# Patient Record
Sex: FEMALE | Race: WHITE | NOT HISPANIC OR LATINO | Employment: FULL TIME | URBAN - METROPOLITAN AREA
[De-identification: names, ages, dates, MRNs, and addresses within clinical notes are randomized per-mention and may not be internally consistent; named-entity substitution may affect disease eponyms.]

---

## 2023-10-31 PROBLEM — K44.9 HIATAL HERNIA: Status: ACTIVE | Noted: 2023-10-31

## 2023-10-31 PROBLEM — Z90.3 S/P GASTRIC SLEEVE PROCEDURE: Status: ACTIVE | Noted: 2023-10-31

## 2023-10-31 PROBLEM — K21.9 GERD (GASTROESOPHAGEAL REFLUX DISEASE): Status: ACTIVE | Noted: 2023-10-31

## 2023-10-31 NOTE — PROGRESS NOTES
GENERAL SURGERY CLINIC  FOLLOW UP NOTE      Name: Juana Christianson  MRN: [id number]      Index Surgery  Date of Surgery: 11/15/2023   Surgeon: Dr. Riaz Contreras    Surgical Procedure: Hiatal hernia repair 26774    HPI: 43 year old female with remote history of a gastric sleeve in 2020 at [facility].  Patient presented to our clinic earlier this year with complains of GERD, not controlled with multiple medications.  Patient is now scheduled for a hiatal hernia repair on 11/15/2023, presenting for a final pre op visit.     Upper GI: FINDINGS:  The patient is status post gastric sleeve procedure. A suture line is  identified along the greater curvature of the gastric remnant in the  distal half of the stomach which extends medially in the proximal  half and above the level of the hemidiaphragm. This appearance  suggests small hiatal hernia. There is associated full thickening at  the level of the small hiatal hernia extending into the stomach  Fundus compatible with gastritis.     The remainder of the stomach is not optimally opacified. There is no  intraluminal filling defect or abnormal collection of contrast. The  visualized fold patterns are otherwise unremarkable. There is no  extravasation of contrast from the surgical site.     The duodenal bulb is well distended. The bulb is of normal  morphology. There is no intraluminal filling defect or abnormal  collection of contrast on barium filled and air contrast images. The  fold pattern is unremarkable.     The post bulbar duodenum is of normal caliber and distribution. The  ligament of Treitz is normally positioned. The fold pattern within  normal limits.     The esophagus is of normal uniform caliber. There is no annular mass  or stricture.    REVIEW OF SYSTEMS:  CONSTITUTIONAL: Patient denies fevers, chills, sweats and weight changes.  CARDIOVASCULAR: Patient denies chest pains, palpitations, orthopnea and paroxysmal nocturnal dyspnea.  RESPIRATORY: No dyspnea  "on exertion, no wheezing or cough.  GI: No nausea, vomiting, diarrhea, constipation, abdominal pain, hematochezia or melena.  MUSCULOSKELETAL: No myalgias or arthralgias.  NEUROLOGIC: No chronic headaches, no seizures. Patient denies numbness, tingling or weakness.  PSYCHIATRIC: Patient denies problems with mood disturbance. No problems with anxiety.  ENDOCRINE: No excessive urination or excessive thirst.  DERMATOLOGIC: Patient denies any rashes or skin changes.    PHYSICAL EXAM:  /83 (BP Location: Left arm, Patient Position: Sitting, BP Cuff Size: Adult)   Pulse 81   Ht 1.575 m (5' 2\")   Wt 64.4 kg (142 lb)   SpO2 99%   BMI 25.97 kg/m²   Alert, well appearing, no acute distress, nourished, hydrated.  Anicteric sclera, no ptosis  Facial symmetry  Neck supple  Unlabored respirations.  Easily conversant without increased respiratory effort  Oriented to person, place, time.  Judgement intact.  Appropriate mood, affect.       ASSESSMENT & PLAN:  43 y.o. female presenting for a final pre-op visit for a hiatal hernia repair scheduled for 11/15/2023.   We had a long discussion again.    Patient reflux symptoms are even worse than before she is having significant regurgitation waking up middle of the night with acid reflux and then not being able to sleep with poor quality of life, feeling tired all the time because of lack of sleep and regretting overall decision of doing sleeve gastrectomy altogether.  We had talked about options in the past including magnetic finger augmentation which she was absolutely not in favor of, hiatal hernia repair alone, gastric bypass.  I received the previous endoscopy reports and on endoscopy she has mixed reports of having small hiatal hernia on 1 and not on the other she did have significant esophagitis on one of the endoscopies which seem to be better on the follow-up EGD however now her symptoms are worse so the status is rather unknown.  Her mom has history of Henry's " esophagus and patient is now worried about herself developing Henry's esophagus which with ongoing reflux and extent of the reflux I think is not an unreasonable fear.  Meanwhile upper GI was obtained which showed I reviewed the images and the proximal sleeve seems to be really angulated although I do not see an obstruction or twisting however the shape of the sleeve itself has taken almost an S shaped.  Based on all the above information I offered her gastric bypass as the first choice with the second choice other options.  She still wants to proceed with diagnostic laparoscopy and hiatal hernia repair however if during the surgery I feel stomach sleeve shape is not fixable then proceed with gastric bypass right now instead of doing it in future which inevitably she may need.  We discussed the risk, benefit alternatives of the gastric bypass and all questions were answered.  Need for vitamin supplementation, risks of bleeding infection, ulcer chronic pain, reflux, hernia, protein calorie malnutrition, consequences of weight loss etc. were all discussed.  We will proceed as planned and discussed above.    Plan:

## 2023-10-31 NOTE — PATIENT INSTRUCTIONS
You are scheduled for a hiatal hernia repair with Dr. Contreras on 11/15/2023.     Please review your dietary guidelines in the folder you were given today.  Please call Mayela at [phone] if you have any questions related to your diet.     We have sent in prescriptions to your pharmacy, please pick them up today.  Ondansetron (nausea), Omeprazole (GERD), Oxycodone (Pain).     If you have any questions please call Dr. Contreras' nurseKrista at [phone].

## 2023-11-03 ENCOUNTER — APPOINTMENT (OUTPATIENT)
Dept: SURGERY | Facility: CLINIC | Age: 43
End: 2023-11-03
Payer: COMMERCIAL

## 2023-11-09 ENCOUNTER — OFFICE VISIT (OUTPATIENT)
Dept: SURGERY | Facility: CLINIC | Age: 43
End: 2023-11-09
Payer: COMMERCIAL

## 2023-11-09 ENCOUNTER — NUTRITION (OUTPATIENT)
Dept: SURGERY | Facility: CLINIC | Age: 43
End: 2023-11-09
Payer: COMMERCIAL

## 2023-11-09 VITALS
OXYGEN SATURATION: 99 % | HEIGHT: 62 IN | WEIGHT: 142 LBS | BODY MASS INDEX: 26.13 KG/M2 | SYSTOLIC BLOOD PRESSURE: 125 MMHG | HEART RATE: 81 BPM | DIASTOLIC BLOOD PRESSURE: 83 MMHG

## 2023-11-09 DIAGNOSIS — K44.9 HIATAL HERNIA: Primary | ICD-10-CM

## 2023-11-09 DIAGNOSIS — R11.2 POST-OPERATIVE NAUSEA AND VOMITING: ICD-10-CM

## 2023-11-09 DIAGNOSIS — K21.9 CHRONIC GASTROESOPHAGEAL REFLUX DISEASE: ICD-10-CM

## 2023-11-09 DIAGNOSIS — Z90.3 S/P GASTRIC SLEEVE PROCEDURE: ICD-10-CM

## 2023-11-09 DIAGNOSIS — G89.18 POST-OP PAIN: ICD-10-CM

## 2023-11-09 DIAGNOSIS — K21.9 GASTROESOPHAGEAL REFLUX DISEASE, UNSPECIFIED WHETHER ESOPHAGITIS PRESENT: ICD-10-CM

## 2023-11-09 DIAGNOSIS — Z98.890 POST-OPERATIVE NAUSEA AND VOMITING: ICD-10-CM

## 2023-11-09 PROBLEM — E66.01 MORBID OBESITY (MULTI): Status: ACTIVE | Noted: 2019-09-07

## 2023-11-09 PROBLEM — E66.01 OBESITY, CLASS III, BMI 40-49.9 (MORBID OBESITY) (MULTI): Status: ACTIVE | Noted: 2019-05-10

## 2023-11-09 PROBLEM — F17.200 TOBACCO USE DISORDER: Status: ACTIVE | Noted: 2019-09-07

## 2023-11-09 PROBLEM — G56.00 CARPAL TUNNEL SYNDROME: Status: ACTIVE | Noted: 2019-05-10

## 2023-11-09 PROCEDURE — 99215 OFFICE O/P EST HI 40 MIN: CPT | Performed by: SURGERY

## 2023-11-09 PROCEDURE — 3008F BODY MASS INDEX DOCD: CPT | Performed by: SURGERY

## 2023-11-09 PROCEDURE — 1036F TOBACCO NON-USER: CPT | Performed by: SURGERY

## 2023-11-09 RX ORDER — SUCRALFATE 1 G/10ML
SUSPENSION ORAL
Status: ON HOLD | COMMUNITY
Start: 2023-01-12 | End: 2023-11-15 | Stop reason: ALTCHOICE

## 2023-11-09 RX ORDER — GABAPENTIN 300 MG/1
600 CAPSULE ORAL ONCE
Status: CANCELLED | OUTPATIENT
Start: 2023-11-09 | End: 2023-11-09

## 2023-11-09 RX ORDER — ESCITALOPRAM OXALATE 10 MG/1
10 TABLET ORAL DAILY
COMMUNITY
Start: 2023-05-30 | End: 2023-11-09 | Stop reason: ALTCHOICE

## 2023-11-09 RX ORDER — DEXTROAMPHETAMINE SACCHARATE, AMPHETAMINE ASPARTATE MONOHYDRATE, DEXTROAMPHETAMINE SULFATE AND AMPHETAMINE SULFATE 3.75; 3.75; 3.75; 3.75 MG/1; MG/1; MG/1; MG/1
15 CAPSULE, EXTENDED RELEASE ORAL EVERY MORNING
COMMUNITY
Start: 2023-10-13 | End: 2024-02-16 | Stop reason: ENTERED-IN-ERROR

## 2023-11-09 RX ORDER — SODIUM CHLORIDE, SODIUM LACTATE, POTASSIUM CHLORIDE, CALCIUM CHLORIDE 600; 310; 30; 20 MG/100ML; MG/100ML; MG/100ML; MG/100ML
150 INJECTION, SOLUTION INTRAVENOUS CONTINUOUS
Status: CANCELLED | OUTPATIENT
Start: 2023-11-15

## 2023-11-09 RX ORDER — OXYCODONE HCL 5 MG/5 ML
5 SOLUTION, ORAL ORAL EVERY 6 HOURS PRN
Qty: 140 ML | Refills: 0 | Status: SHIPPED | OUTPATIENT
Start: 2023-11-09 | End: 2023-11-17 | Stop reason: HOSPADM

## 2023-11-09 RX ORDER — OMEPRAZOLE 40 MG/1
40 CAPSULE, DELAYED RELEASE ORAL
Qty: 90 CAPSULE | Refills: 1 | Status: SHIPPED | OUTPATIENT
Start: 2023-11-09 | End: 2024-05-07

## 2023-11-09 RX ORDER — DEXTROAMPHETAMINE SACCHARATE, AMPHETAMINE ASPARTATE MONOHYDRATE, DEXTROAMPHETAMINE SULFATE AND AMPHETAMINE SULFATE 2.5; 2.5; 2.5; 2.5 MG/1; MG/1; MG/1; MG/1
10 CAPSULE, EXTENDED RELEASE ORAL EVERY MORNING
COMMUNITY
Start: 2023-07-05 | End: 2023-11-14 | Stop reason: ALTCHOICE

## 2023-11-09 RX ORDER — SERTRALINE HYDROCHLORIDE 50 MG/1
50 TABLET, FILM COATED ORAL DAILY
COMMUNITY
Start: 2023-04-28 | End: 2023-11-09 | Stop reason: ALTCHOICE

## 2023-11-09 RX ORDER — OMEPRAZOLE 40 MG/1
80 CAPSULE, DELAYED RELEASE ORAL EVERY MORNING
Status: ON HOLD | COMMUNITY
Start: 2022-12-02 | End: 2023-11-15 | Stop reason: ALTCHOICE

## 2023-11-09 RX ORDER — FAMOTIDINE 20 MG/1
20 TABLET, FILM COATED ORAL 2 TIMES DAILY PRN
COMMUNITY
Start: 2023-02-07

## 2023-11-09 RX ORDER — ONDANSETRON 4 MG/1
4 TABLET, ORALLY DISINTEGRATING ORAL EVERY 6 HOURS PRN
Qty: 60 TABLET | Refills: 1 | Status: SHIPPED | OUTPATIENT
Start: 2023-11-09

## 2023-11-09 RX ORDER — SCOLOPAMINE TRANSDERMAL SYSTEM 1 MG/1
1 PATCH, EXTENDED RELEASE TRANSDERMAL ONCE
Status: CANCELLED | OUTPATIENT
Start: 2023-11-09 | End: 2023-11-09

## 2023-11-09 RX ORDER — CEFAZOLIN SODIUM 2 G/100ML
2 INJECTION, SOLUTION INTRAVENOUS ONCE
Status: CANCELLED | OUTPATIENT
Start: 2023-11-15 | End: 2023-11-09

## 2023-11-09 NOTE — PROGRESS NOTES
Saw pt today  to review postop diet for HHR.   Since pt is s/p sleeve gastrectomy, gave her a copy of the NG for Gastric bypass and sleeve.   Reviewed fluid and protein goals.   Discussed postop supplements and need to take chewables for after surgery.   Reviewed the full liquid diet for after surgery.   Pt will call if she has questions.   Mayela

## 2023-11-14 PROBLEM — K21.9 CHRONIC GASTROESOPHAGEAL REFLUX DISEASE: Status: ACTIVE | Noted: 2023-11-14

## 2023-11-14 ASSESSMENT — ENCOUNTER SYMPTOMS
TREMORS: 0
APNEA: 0
COLOR CHANGE: 0
FLANK PAIN: 0
FACIAL SWELLING: 0
LIGHT-HEADEDNESS: 0
VOMITING: 0
FEVER: 0
CHOKING: 0
HALLUCINATIONS: 0
CONFUSION: 0
POLYDIPSIA: 0
SINUS PAIN: 0
AGITATION: 0
NAUSEA: 0
CHEST TIGHTNESS: 0
PALPITATIONS: 0
RHINORRHEA: 0
DIFFICULTY URINATING: 0
NUMBNESS: 0
CHILLS: 0
POLYPHAGIA: 0

## 2023-11-14 NOTE — H&P
History Of Present Illness  Juana Christianson is a 43 y.o. female presenting  presenting for evaluation of reflux symptoms are even worse than before she is having significant regurgitation waking up middle of the night with acid reflux and then not being able to sleep with poor quality of life, feeling tired all the time because of lack of sleep and regretting overall decision of doing sleeve gastrectomy altogether.  We had talked about options in the past including magnetic finger augmentation which she was absolutely not in favor of, hiatal hernia repair alone, gastric bypass.  I received the previous endoscopy reports and on endoscopy she has mixed reports of having small hiatal hernia on 1 and not on the other she did have significant esophagitis on one of the endoscopies which seem to be better on the follow-up EGD however now her symptoms are worse so the status is rather unknown.  Her mom has history of Henry's esophagus and patient is now worried about herself developing Henry's esophagus which with ongoing reflux and extent of the reflux I think is not an unreasonable fear.  Meanwhile upper GI was obtained which showed I reviewed the images and the proximal sleeve seems to be really angulated although I do not see an obstruction or twisting however the shape of the sleeve itself has taken almost an S shaped.  Based on all the above information I offered her gastric bypass as the first choice with the second choice other options.  She still wants to proceed with diagnostic laparoscopy and hiatal hernia repair however if during the surgery I feel stomach sleeve shape is not fixable then proceed with gastric bypass right now instead of doing it in future which inevitably she may need.  We discussed the risk, benefit alternatives of the gastric bypass and all questions were answered.  Need for vitamin supplementation, risks of bleeding infection, ulcer chronic pain, reflux, hernia, protein calorie  malnutrition, consequences of weight loss etc. were all discussed.     Past Medical History  Past Medical History:   Diagnosis Date    H/O gastric sleeve     Hiatal hernia        Surgical History  Past Surgical History:   Procedure Laterality Date    CHOLECYSTECTOMY  2009    GASTRIC BYPASS  3/13/20    HYSTERECTOMY  2010    OTHER SURGICAL HISTORY      Sleeve Gastrectomy at [facility] in 2020        Social History  She reports that she quit smoking about 3 years ago. Her smoking use included cigarettes. She started smoking about 7 years ago. She has a 2.50 pack-year smoking history. She has never used smokeless tobacco. She reports that she does not currently use alcohol. She reports that she does not use drugs.    Family History  Family History   Problem Relation Name Age of Onset    Stroke Mother Araceli Christianson     Heart disease Father Quentin Wong         Allergies  Patient has no known allergies.    Review of Systems   Constitutional:  Negative for chills and fever.   HENT:  Negative for facial swelling, nosebleeds, rhinorrhea and sinus pain.    Respiratory:  Negative for apnea, choking and chest tightness.    Cardiovascular:  Negative for chest pain, palpitations and leg swelling.   Gastrointestinal:  Negative for nausea and vomiting.   Endocrine: Negative for polydipsia, polyphagia and polyuria.   Genitourinary:  Negative for difficulty urinating, flank pain and urgency.   Skin:  Negative for color change, pallor and rash.   Neurological:  Negative for tremors, light-headedness and numbness.   Psychiatric/Behavioral:  Negative for agitation, behavioral problems, confusion, hallucinations and self-injury.         Physical Exam   Physical Exam:    Constitutional: no distress, alert and cooperative    Eyes: EOMI, clear sclera    Head/Neck: Neck supple, no apparent injury, No JVD, trachea midline    Respiratory/Thorax: good chest expansion, thorax symmetric    Cardiovascular: Regular heart rate    Abdominal:  Incisions intact and clean, nondistended, soft, normal     Musculoskeletal: ROM intact, no joint swelling    Extremities: Grossly normal extremities    Neurological: alert and oriented x3, intact senses    Psychological: Appropriate mood and behavior    Skin: Warm and dry, no lesions, no rashes   Last Recorded Vitals  There were no vitals taken for this visit.    Relevant Results        c     Assessment/Plan   Active Problems:  There are no active Hospital Problems.      Pt is a 44 y/o female with a history of sleeve gastrectomy here for significant reflux     Plan   Will perform a hiatal hernia repair with acid diverging procedure     I spent 25 minutes in the professional and overall care of this patient.      Delvin Lorenz MD

## 2023-11-14 NOTE — PREPROCEDURE INSTRUCTIONS
Current Medications   Medication Instructions    amphetamine-dextroamphetamine XR (Adderall XR) 15 mg 24 hr capsule Continue until night before surgery    famotidine (Pepcid) 20 mg tablet Take morning of surgery with sip of water, no other fluids    multivitamin/iron/folic acid (CENTRUM WOMEN ORAL) Continue until night before surgery    omeprazole (PriLOSEC) 40 mg DR capsule Take morning of surgery with sip of water, no other fluids    sucralfate (Carafate) 100 mg/mL suspension Continue until night before surgery       Arrive at 0630 AM for surgery at 0830 AM   Enter through main entrance of [facility] building, located at [address] [place] [address]. Proceed to registration, located in the back right hand corner. You will need your ID and insurance card for registration. Please ensure you have a responsible adult to drive you home.     Shower the morning of or night before your procedure. After you shower avoid lotions, powders, deodorants or anything applied to the skin. If you wear contacts or glasses, wear the glasses. If you do not have glasses, please bring a case for your contacts. You may wear hearing aids and dentures, bring a case for them or we will provide one. Make sure you wear something loose and comfortable. Keep in mind your surgery type and wear something that will accommodate incisions or bandages. Please remove all jewelry.     Nothing to eat or drink after midnight (including coffee, tea, gum etc). You may take medications discussed during phone call with a small sip of water.    For further questions [place] JANUSZ can be contacted at [phone] between 7AM-3PM.

## 2023-11-15 ENCOUNTER — ANESTHESIA (OUTPATIENT)
Dept: OPERATING ROOM | Facility: HOSPITAL | Age: 43
DRG: 328 | End: 2023-11-15
Payer: COMMERCIAL

## 2023-11-15 ENCOUNTER — ANESTHESIA EVENT (OUTPATIENT)
Dept: OPERATING ROOM | Facility: HOSPITAL | Age: 43
DRG: 328 | End: 2023-11-15
Payer: COMMERCIAL

## 2023-11-15 ENCOUNTER — HOSPITAL ENCOUNTER (INPATIENT)
Facility: HOSPITAL | Age: 43
LOS: 2 days | Discharge: HOME | DRG: 328 | End: 2023-11-17
Attending: SURGERY | Admitting: SURGERY
Payer: COMMERCIAL

## 2023-11-15 DIAGNOSIS — K44.9 HIATAL HERNIA: ICD-10-CM

## 2023-11-15 DIAGNOSIS — K44.9 DIAPHRAGMATIC HERNIA WITHOUT OBSTRUCTION OR GANGRENE: ICD-10-CM

## 2023-11-15 DIAGNOSIS — K21.9 CHRONIC GASTROESOPHAGEAL REFLUX DISEASE: Primary | ICD-10-CM

## 2023-11-15 DIAGNOSIS — K21.9 GASTROESOPHAGEAL REFLUX DISEASE, UNSPECIFIED WHETHER ESOPHAGITIS PRESENT: ICD-10-CM

## 2023-11-15 DIAGNOSIS — G89.18 POST-OP PAIN: ICD-10-CM

## 2023-11-15 LAB
ERYTHROCYTE [DISTWIDTH] IN BLOOD BY AUTOMATED COUNT: 13.1 % (ref 11.5–14.5)
HCT VFR BLD AUTO: 41.7 % (ref 36–46)
HGB BLD-MCNC: 14.3 G/DL (ref 12–16)
MCH RBC QN AUTO: 31.6 PG (ref 26–34)
MCHC RBC AUTO-ENTMCNC: 34.3 G/DL (ref 32–36)
MCV RBC AUTO: 92 FL (ref 80–100)
NRBC BLD-RTO: 0 /100 WBCS (ref 0–0)
PLATELET # BLD AUTO: 248 X10*3/UL (ref 150–450)
RBC # BLD AUTO: 4.52 X10*6/UL (ref 4–5.2)
WBC # BLD AUTO: 5.2 X10*3/UL (ref 4.4–11.3)

## 2023-11-15 PROCEDURE — 3600000009 HC OR TIME - EACH INCREMENTAL 1 MINUTE - PROCEDURE LEVEL FOUR: Performed by: SURGERY

## 2023-11-15 PROCEDURE — 43644 LAP GASTRIC BYPASS/ROUX-EN-Y: CPT | Performed by: SURGERY

## 2023-11-15 PROCEDURE — A43644 PR LAP GASTRIC BYPASS/ROUX-EN-Y: Performed by: ANESTHESIOLOGIST ASSISTANT

## 2023-11-15 PROCEDURE — 36415 COLL VENOUS BLD VENIPUNCTURE: CPT | Performed by: STUDENT IN AN ORGANIZED HEALTH CARE EDUCATION/TRAINING PROGRAM

## 2023-11-15 PROCEDURE — 0D164ZA BYPASS STOMACH TO JEJUNUM, PERCUTANEOUS ENDOSCOPIC APPROACH: ICD-10-PCS | Performed by: SURGERY

## 2023-11-15 PROCEDURE — 7100000001 HC RECOVERY ROOM TIME - INITIAL BASE CHARGE: Performed by: SURGERY

## 2023-11-15 PROCEDURE — 2500000004 HC RX 250 GENERAL PHARMACY W/ HCPCS (ALT 636 FOR OP/ED): Performed by: STUDENT IN AN ORGANIZED HEALTH CARE EDUCATION/TRAINING PROGRAM

## 2023-11-15 PROCEDURE — 3700000002 HC GENERAL ANESTHESIA TIME - EACH INCREMENTAL 1 MINUTE: Performed by: SURGERY

## 2023-11-15 PROCEDURE — 43281 LAP PARAESOPHAG HERN REPAIR: CPT | Performed by: STUDENT IN AN ORGANIZED HEALTH CARE EDUCATION/TRAINING PROGRAM

## 2023-11-15 PROCEDURE — 96372 THER/PROPH/DIAG INJ SC/IM: CPT | Performed by: STUDENT IN AN ORGANIZED HEALTH CARE EDUCATION/TRAINING PROGRAM

## 2023-11-15 PROCEDURE — 7100000002 HC RECOVERY ROOM TIME - EACH INCREMENTAL 1 MINUTE: Performed by: SURGERY

## 2023-11-15 PROCEDURE — 85027 COMPLETE CBC AUTOMATED: CPT | Performed by: STUDENT IN AN ORGANIZED HEALTH CARE EDUCATION/TRAINING PROGRAM

## 2023-11-15 PROCEDURE — 3700000001 HC GENERAL ANESTHESIA TIME - INITIAL BASE CHARGE: Performed by: SURGERY

## 2023-11-15 PROCEDURE — C1781 MESH (IMPLANTABLE): HCPCS | Performed by: SURGERY

## 2023-11-15 PROCEDURE — A43644 PR LAP GASTRIC BYPASS/ROUX-EN-Y: Performed by: ANESTHESIOLOGY

## 2023-11-15 PROCEDURE — 2500000004 HC RX 250 GENERAL PHARMACY W/ HCPCS (ALT 636 FOR OP/ED): Performed by: ANESTHESIOLOGIST ASSISTANT

## 2023-11-15 PROCEDURE — 2500000005 HC RX 250 GENERAL PHARMACY W/O HCPCS: Performed by: ANESTHESIOLOGIST ASSISTANT

## 2023-11-15 PROCEDURE — 2720000007 HC OR 272 NO HCPCS: Performed by: SURGERY

## 2023-11-15 PROCEDURE — 0DJ08ZZ INSPECTION OF UPPER INTESTINAL TRACT, VIA NATURAL OR ARTIFICIAL OPENING ENDOSCOPIC: ICD-10-PCS | Performed by: SURGERY

## 2023-11-15 PROCEDURE — 2500000005 HC RX 250 GENERAL PHARMACY W/O HCPCS: Performed by: STUDENT IN AN ORGANIZED HEALTH CARE EDUCATION/TRAINING PROGRAM

## 2023-11-15 PROCEDURE — 43281 LAP PARAESOPHAG HERN REPAIR: CPT | Performed by: SURGERY

## 2023-11-15 PROCEDURE — 1100000001 HC PRIVATE ROOM DAILY

## 2023-11-15 PROCEDURE — 0BQT4ZZ REPAIR DIAPHRAGM, PERCUTANEOUS ENDOSCOPIC APPROACH: ICD-10-PCS | Performed by: SURGERY

## 2023-11-15 PROCEDURE — 2500000004 HC RX 250 GENERAL PHARMACY W/ HCPCS (ALT 636 FOR OP/ED): Performed by: SURGERY

## 2023-11-15 PROCEDURE — 2500000004 HC RX 250 GENERAL PHARMACY W/ HCPCS (ALT 636 FOR OP/ED): Performed by: ANESTHESIOLOGY

## 2023-11-15 PROCEDURE — 3600000004 HC OR TIME - INITIAL BASE CHARGE - PROCEDURE LEVEL FOUR: Performed by: SURGERY

## 2023-11-15 PROCEDURE — 2500000001 HC RX 250 WO HCPCS SELF ADMINISTERED DRUGS (ALT 637 FOR MEDICARE OP): Performed by: STUDENT IN AN ORGANIZED HEALTH CARE EDUCATION/TRAINING PROGRAM

## 2023-11-15 PROCEDURE — 2780000003 HC OR 278 NO HCPCS: Performed by: SURGERY

## 2023-11-15 RX ORDER — SUCCINYLCHOLINE CHLORIDE 20 MG/ML INJECTION SOLUTION
SOLUTION AS NEEDED
Status: DISCONTINUED | OUTPATIENT
Start: 2023-11-15 | End: 2023-11-15

## 2023-11-15 RX ORDER — LABETALOL HYDROCHLORIDE 5 MG/ML
5 INJECTION, SOLUTION INTRAVENOUS ONCE AS NEEDED
Status: DISCONTINUED | OUTPATIENT
Start: 2023-11-15 | End: 2023-11-15 | Stop reason: HOSPADM

## 2023-11-15 RX ORDER — ONDANSETRON HYDROCHLORIDE 2 MG/ML
INJECTION, SOLUTION INTRAVENOUS AS NEEDED
Status: DISCONTINUED | OUTPATIENT
Start: 2023-11-15 | End: 2023-11-15

## 2023-11-15 RX ORDER — MEPERIDINE HYDROCHLORIDE 25 MG/ML
INJECTION INTRAMUSCULAR; INTRAVENOUS; SUBCUTANEOUS AS NEEDED
Status: DISCONTINUED | OUTPATIENT
Start: 2023-11-15 | End: 2023-11-15

## 2023-11-15 RX ORDER — ACETAMINOPHEN 650 MG/1
650 SUPPOSITORY RECTAL EVERY 6 HOURS
Status: DISCONTINUED | OUTPATIENT
Start: 2023-11-15 | End: 2023-11-17 | Stop reason: HOSPADM

## 2023-11-15 RX ORDER — ONDANSETRON HYDROCHLORIDE 2 MG/ML
4 INJECTION, SOLUTION INTRAVENOUS ONCE AS NEEDED
Status: DISCONTINUED | OUTPATIENT
Start: 2023-11-15 | End: 2023-11-15 | Stop reason: HOSPADM

## 2023-11-15 RX ORDER — OXYCODONE HCL 5 MG/5 ML
5 SOLUTION, ORAL ORAL EVERY 4 HOURS PRN
Status: DISCONTINUED | OUTPATIENT
Start: 2023-11-15 | End: 2023-11-17 | Stop reason: HOSPADM

## 2023-11-15 RX ORDER — NORETHINDRONE AND ETHINYL ESTRADIOL 0.5-0.035
KIT ORAL AS NEEDED
Status: DISCONTINUED | OUTPATIENT
Start: 2023-11-15 | End: 2023-11-15

## 2023-11-15 RX ORDER — NALOXONE HYDROCHLORIDE 1 MG/ML
0.2 INJECTION INTRAMUSCULAR; INTRAVENOUS; SUBCUTANEOUS EVERY 5 MIN PRN
Status: DISCONTINUED | OUTPATIENT
Start: 2023-11-15 | End: 2023-11-17 | Stop reason: HOSPADM

## 2023-11-15 RX ORDER — CEFAZOLIN SODIUM 2 G/100ML
2 INJECTION, SOLUTION INTRAVENOUS EVERY 8 HOURS
Status: COMPLETED | OUTPATIENT
Start: 2023-11-15 | End: 2023-11-16

## 2023-11-15 RX ORDER — GABAPENTIN 250 MG/5ML
100 SOLUTION ORAL 2 TIMES DAILY
Status: DISCONTINUED | OUTPATIENT
Start: 2023-11-15 | End: 2023-11-17 | Stop reason: HOSPADM

## 2023-11-15 RX ORDER — METOCLOPRAMIDE HYDROCHLORIDE 5 MG/ML
10 INJECTION INTRAMUSCULAR; INTRAVENOUS EVERY 6 HOURS PRN
Status: DISCONTINUED | OUTPATIENT
Start: 2023-11-15 | End: 2023-11-17 | Stop reason: HOSPADM

## 2023-11-15 RX ORDER — BUPIVACAINE HYDROCHLORIDE 2.5 MG/ML
INJECTION, SOLUTION EPIDURAL; INFILTRATION; INTRACAUDAL AS NEEDED
Status: DISCONTINUED | OUTPATIENT
Start: 2023-11-15 | End: 2023-11-15 | Stop reason: HOSPADM

## 2023-11-15 RX ORDER — ACETAMINOPHEN 325 MG/1
650 TABLET ORAL EVERY 6 HOURS
Status: DISCONTINUED | OUTPATIENT
Start: 2023-11-15 | End: 2023-11-16

## 2023-11-15 RX ORDER — HYDRALAZINE HYDROCHLORIDE 20 MG/ML
5 INJECTION INTRAMUSCULAR; INTRAVENOUS EVERY 30 MIN PRN
Status: DISCONTINUED | OUTPATIENT
Start: 2023-11-15 | End: 2023-11-15 | Stop reason: HOSPADM

## 2023-11-15 RX ORDER — PANTOPRAZOLE SODIUM 40 MG/1
40 TABLET, DELAYED RELEASE ORAL
Status: DISCONTINUED | OUTPATIENT
Start: 2023-11-16 | End: 2023-11-17 | Stop reason: HOSPADM

## 2023-11-15 RX ORDER — ONDANSETRON HYDROCHLORIDE 2 MG/ML
4 INJECTION, SOLUTION INTRAVENOUS EVERY 8 HOURS PRN
Status: DISCONTINUED | OUTPATIENT
Start: 2023-11-15 | End: 2023-11-17 | Stop reason: HOSPADM

## 2023-11-15 RX ORDER — ACETAMINOPHEN 160 MG/5ML
650 SOLUTION ORAL EVERY 6 HOURS
Status: DISCONTINUED | OUTPATIENT
Start: 2023-11-15 | End: 2023-11-17 | Stop reason: HOSPADM

## 2023-11-15 RX ORDER — METOCLOPRAMIDE 10 MG/1
10 TABLET ORAL EVERY 6 HOURS PRN
Status: DISCONTINUED | OUTPATIENT
Start: 2023-11-15 | End: 2023-11-17 | Stop reason: HOSPADM

## 2023-11-15 RX ORDER — HYDRALAZINE HYDROCHLORIDE 20 MG/ML
5 INJECTION INTRAMUSCULAR; INTRAVENOUS EVERY 4 HOURS PRN
Status: DISCONTINUED | OUTPATIENT
Start: 2023-11-15 | End: 2023-11-17 | Stop reason: HOSPADM

## 2023-11-15 RX ORDER — ESOMEPRAZOLE MAGNESIUM 40 MG/1
40 GRANULE, DELAYED RELEASE ORAL
Status: DISCONTINUED | OUTPATIENT
Start: 2023-11-16 | End: 2023-11-17 | Stop reason: HOSPADM

## 2023-11-15 RX ORDER — MIDAZOLAM HYDROCHLORIDE 1 MG/ML
1 INJECTION, SOLUTION INTRAMUSCULAR; INTRAVENOUS ONCE AS NEEDED
Status: DISCONTINUED | OUTPATIENT
Start: 2023-11-15 | End: 2023-11-15 | Stop reason: HOSPADM

## 2023-11-15 RX ORDER — ACETAMINOPHEN 325 MG/1
650 TABLET ORAL EVERY 6 HOURS
Status: DISCONTINUED | OUTPATIENT
Start: 2023-11-15 | End: 2023-11-17 | Stop reason: HOSPADM

## 2023-11-15 RX ORDER — DEXAMETHASONE SODIUM PHOSPHATE 4 MG/ML
INJECTION, SOLUTION INTRA-ARTICULAR; INTRALESIONAL; INTRAMUSCULAR; INTRAVENOUS; SOFT TISSUE AS NEEDED
Status: DISCONTINUED | OUTPATIENT
Start: 2023-11-15 | End: 2023-11-15

## 2023-11-15 RX ORDER — SODIUM CHLORIDE, SODIUM LACTATE, POTASSIUM CHLORIDE, CALCIUM CHLORIDE 600; 310; 30; 20 MG/100ML; MG/100ML; MG/100ML; MG/100ML
150 INJECTION, SOLUTION INTRAVENOUS CONTINUOUS
Status: DISCONTINUED | OUTPATIENT
Start: 2023-11-15 | End: 2023-11-17 | Stop reason: HOSPADM

## 2023-11-15 RX ORDER — GABAPENTIN 300 MG/1
600 CAPSULE ORAL ONCE
Status: COMPLETED | OUTPATIENT
Start: 2023-11-15 | End: 2023-11-15

## 2023-11-15 RX ORDER — GABAPENTIN 100 MG/1
100 CAPSULE ORAL 2 TIMES DAILY
Status: DISCONTINUED | OUTPATIENT
Start: 2023-11-15 | End: 2023-11-15

## 2023-11-15 RX ORDER — OXYCODONE HCL 5 MG/5 ML
10 SOLUTION, ORAL ORAL EVERY 4 HOURS PRN
Status: DISCONTINUED | OUTPATIENT
Start: 2023-11-15 | End: 2023-11-17 | Stop reason: HOSPADM

## 2023-11-15 RX ORDER — HEPARIN SODIUM 5000 [USP'U]/ML
5000 INJECTION, SOLUTION INTRAVENOUS; SUBCUTANEOUS ONCE
Status: COMPLETED | OUTPATIENT
Start: 2023-11-15 | End: 2023-11-15

## 2023-11-15 RX ORDER — PROPOFOL 10 MG/ML
INJECTION, EMULSION INTRAVENOUS AS NEEDED
Status: DISCONTINUED | OUTPATIENT
Start: 2023-11-15 | End: 2023-11-15

## 2023-11-15 RX ORDER — MULTIVIT-MIN/FERROUS GLUCONATE 9 MG/15 ML
15 LIQUID (ML) ORAL DAILY
Status: DISCONTINUED | OUTPATIENT
Start: 2023-11-15 | End: 2023-11-17 | Stop reason: HOSPADM

## 2023-11-15 RX ORDER — SCOLOPAMINE TRANSDERMAL SYSTEM 1 MG/1
1 PATCH, EXTENDED RELEASE TRANSDERMAL ONCE
Status: DISCONTINUED | OUTPATIENT
Start: 2023-11-15 | End: 2023-11-17 | Stop reason: HOSPADM

## 2023-11-15 RX ORDER — MEPERIDINE HYDROCHLORIDE 25 MG/ML
12.5 INJECTION INTRAMUSCULAR; INTRAVENOUS; SUBCUTANEOUS EVERY 10 MIN PRN
Status: DISCONTINUED | OUTPATIENT
Start: 2023-11-15 | End: 2023-11-15 | Stop reason: HOSPADM

## 2023-11-15 RX ORDER — MIDAZOLAM HYDROCHLORIDE 1 MG/ML
INJECTION, SOLUTION INTRAMUSCULAR; INTRAVENOUS AS NEEDED
Status: DISCONTINUED | OUTPATIENT
Start: 2023-11-15 | End: 2023-11-15

## 2023-11-15 RX ORDER — PANTOPRAZOLE SODIUM 40 MG/10ML
40 INJECTION, POWDER, LYOPHILIZED, FOR SOLUTION INTRAVENOUS
Status: DISCONTINUED | OUTPATIENT
Start: 2023-11-16 | End: 2023-11-17 | Stop reason: HOSPADM

## 2023-11-15 RX ORDER — CEFAZOLIN SODIUM 2 G/100ML
2 INJECTION, SOLUTION INTRAVENOUS ONCE
Status: COMPLETED | OUTPATIENT
Start: 2023-11-15 | End: 2023-11-15

## 2023-11-15 RX ORDER — ROCURONIUM BROMIDE 10 MG/ML
INJECTION, SOLUTION INTRAVENOUS AS NEEDED
Status: DISCONTINUED | OUTPATIENT
Start: 2023-11-15 | End: 2023-11-15

## 2023-11-15 RX ORDER — SIMETHICONE 80 MG
80 TABLET,CHEWABLE ORAL EVERY 4 HOURS PRN
Status: DISCONTINUED | OUTPATIENT
Start: 2023-11-15 | End: 2023-11-17 | Stop reason: HOSPADM

## 2023-11-15 RX ORDER — KETOROLAC TROMETHAMINE 30 MG/ML
15 INJECTION, SOLUTION INTRAMUSCULAR; INTRAVENOUS EVERY 6 HOURS
Status: DISPENSED | OUTPATIENT
Start: 2023-11-15 | End: 2023-11-16

## 2023-11-15 RX ORDER — LIDOCAINE 560 MG/1
1 PATCH PERCUTANEOUS; TOPICAL; TRANSDERMAL EVERY 24 HOURS
Status: DISCONTINUED | OUTPATIENT
Start: 2023-11-15 | End: 2023-11-17 | Stop reason: HOSPADM

## 2023-11-15 RX ORDER — MAGNESIUM HYDROXIDE 2400 MG/10ML
10 SUSPENSION ORAL DAILY PRN
Status: DISCONTINUED | OUTPATIENT
Start: 2023-11-15 | End: 2023-11-17 | Stop reason: HOSPADM

## 2023-11-15 RX ORDER — ALBUTEROL SULFATE 0.83 MG/ML
2.5 SOLUTION RESPIRATORY (INHALATION) ONCE AS NEEDED
Status: DISCONTINUED | OUTPATIENT
Start: 2023-11-15 | End: 2023-11-15 | Stop reason: HOSPADM

## 2023-11-15 RX ORDER — LIDOCAINE HCL/PF 100 MG/5ML
SYRINGE (ML) INTRAVENOUS AS NEEDED
Status: DISCONTINUED | OUTPATIENT
Start: 2023-11-15 | End: 2023-11-15

## 2023-11-15 RX ORDER — PROMETHAZINE HYDROCHLORIDE 25 MG/ML
INJECTION, SOLUTION INTRAMUSCULAR; INTRAVENOUS AS NEEDED
Status: DISCONTINUED | OUTPATIENT
Start: 2023-11-15 | End: 2023-11-15

## 2023-11-15 RX ORDER — CLONIDINE 100 UG/ML
INJECTION, SOLUTION EPIDURAL AS NEEDED
Status: DISCONTINUED | OUTPATIENT
Start: 2023-11-15 | End: 2023-11-15 | Stop reason: HOSPADM

## 2023-11-15 RX ORDER — SODIUM CHLORIDE, SODIUM LACTATE, POTASSIUM CHLORIDE, CALCIUM CHLORIDE 600; 310; 30; 20 MG/100ML; MG/100ML; MG/100ML; MG/100ML
100 INJECTION, SOLUTION INTRAVENOUS CONTINUOUS
Status: DISCONTINUED | OUTPATIENT
Start: 2023-11-15 | End: 2023-11-15 | Stop reason: HOSPADM

## 2023-11-15 RX ORDER — SODIUM CHLORIDE, SODIUM LACTATE, POTASSIUM CHLORIDE, CALCIUM CHLORIDE 600; 310; 30; 20 MG/100ML; MG/100ML; MG/100ML; MG/100ML
100 INJECTION, SOLUTION INTRAVENOUS CONTINUOUS
Status: DISCONTINUED | OUTPATIENT
Start: 2023-11-15 | End: 2023-11-17 | Stop reason: HOSPADM

## 2023-11-15 RX ORDER — DEXMEDETOMIDINE HYDROCHLORIDE 100 UG/ML
INJECTION, SOLUTION INTRAVENOUS AS NEEDED
Status: DISCONTINUED | OUTPATIENT
Start: 2023-11-15 | End: 2023-11-15

## 2023-11-15 RX ORDER — DOCUSATE SODIUM 50 MG/5ML
100 LIQUID ORAL 2 TIMES DAILY
Status: DISCONTINUED | OUTPATIENT
Start: 2023-11-15 | End: 2023-11-17 | Stop reason: HOSPADM

## 2023-11-15 RX ORDER — ACETAMINOPHEN 325 MG/1
650 TABLET ORAL EVERY 4 HOURS PRN
Status: DISCONTINUED | OUTPATIENT
Start: 2023-11-15 | End: 2023-11-15 | Stop reason: HOSPADM

## 2023-11-15 RX ORDER — LIDOCAINE HYDROCHLORIDE 10 MG/ML
0.1 INJECTION, SOLUTION EPIDURAL; INFILTRATION; INTRACAUDAL; PERINEURAL ONCE
Status: DISCONTINUED | OUTPATIENT
Start: 2023-11-15 | End: 2023-11-15 | Stop reason: HOSPADM

## 2023-11-15 RX ORDER — FENTANYL CITRATE 50 UG/ML
INJECTION, SOLUTION INTRAMUSCULAR; INTRAVENOUS AS NEEDED
Status: DISCONTINUED | OUTPATIENT
Start: 2023-11-15 | End: 2023-11-15

## 2023-11-15 RX ORDER — ONDANSETRON 4 MG/1
4 TABLET, ORALLY DISINTEGRATING ORAL EVERY 8 HOURS PRN
Status: DISCONTINUED | OUTPATIENT
Start: 2023-11-15 | End: 2023-11-17 | Stop reason: HOSPADM

## 2023-11-15 RX ORDER — ACETAMINOPHEN 160 MG/5ML
650 SOLUTION ORAL EVERY 4 HOURS PRN
Status: DISCONTINUED | OUTPATIENT
Start: 2023-11-15 | End: 2023-11-17 | Stop reason: HOSPADM

## 2023-11-15 RX ADMIN — FENTANYL CITRATE 50 MCG: 50 INJECTION, SOLUTION INTRAMUSCULAR; INTRAVENOUS at 08:19

## 2023-11-15 RX ADMIN — DEXMEDETOMIDINE HCL 4 MCG: 100 INJECTION INTRAVENOUS at 09:32

## 2023-11-15 RX ADMIN — SUGAMMADEX 250 MG: 100 INJECTION, SOLUTION INTRAVENOUS at 09:20

## 2023-11-15 RX ADMIN — DEXMEDETOMIDINE HCL 4 MCG: 100 INJECTION INTRAVENOUS at 09:11

## 2023-11-15 RX ADMIN — DOCUSATE SODIUM LIQUID 100 MG: 100 LIQUID ORAL at 21:45

## 2023-11-15 RX ADMIN — EPHEDRINE SULFATE 10 MG: 50 INJECTION, SOLUTION INTRAVENOUS at 08:37

## 2023-11-15 RX ADMIN — ROCURONIUM BROMIDE 10 MG: 10 INJECTION, SOLUTION INTRAVENOUS at 09:00

## 2023-11-15 RX ADMIN — Medication 160 MG: at 08:05

## 2023-11-15 RX ADMIN — HYDROMORPHONE HYDROCHLORIDE 0.5 MG: 1 INJECTION, SOLUTION INTRAMUSCULAR; INTRAVENOUS; SUBCUTANEOUS at 10:56

## 2023-11-15 RX ADMIN — HEPARIN SODIUM 5000 UNITS: 5000 INJECTION INTRAVENOUS; SUBCUTANEOUS at 07:28

## 2023-11-15 RX ADMIN — CEFAZOLIN SODIUM 2 G: 2 INJECTION, SOLUTION INTRAVENOUS at 08:13

## 2023-11-15 RX ADMIN — MIDAZOLAM 2 MG: 1 INJECTION INTRAMUSCULAR; INTRAVENOUS at 07:58

## 2023-11-15 RX ADMIN — PROMETHAZINE HYDROCHLORIDE 6.25 MG: 25 INJECTION INTRAMUSCULAR; INTRAVENOUS at 09:34

## 2023-11-15 RX ADMIN — DEXMEDETOMIDINE HCL 4 MCG: 100 INJECTION INTRAVENOUS at 08:46

## 2023-11-15 RX ADMIN — DEXAMETHASONE SODIUM PHOSPHATE 4 MG: 4 INJECTION, SOLUTION INTRAMUSCULAR; INTRAVENOUS at 08:25

## 2023-11-15 RX ADMIN — GABAPENTIN 100 MG: 250 SOLUTION ORAL at 21:44

## 2023-11-15 RX ADMIN — FENTANYL CITRATE 50 MCG: 50 INJECTION, SOLUTION INTRAMUSCULAR; INTRAVENOUS at 08:24

## 2023-11-15 RX ADMIN — FENTANYL CITRATE 50 MCG: 50 INJECTION, SOLUTION INTRAMUSCULAR; INTRAVENOUS at 09:11

## 2023-11-15 RX ADMIN — CEFAZOLIN SODIUM 2 G: 2 INJECTION, SOLUTION INTRAVENOUS at 16:16

## 2023-11-15 RX ADMIN — ACETAMINOPHEN 650 MG: 160 SOLUTION ORAL at 21:42

## 2023-11-15 RX ADMIN — LIDOCAINE 1 PATCH: 4 PATCH TOPICAL at 21:46

## 2023-11-15 RX ADMIN — KETOROLAC TROMETHAMINE 15 MG: 30 INJECTION, SOLUTION INTRAMUSCULAR; INTRAVENOUS at 21:45

## 2023-11-15 RX ADMIN — HYDROMORPHONE HYDROCHLORIDE 0.2 MG: 1 INJECTION, SOLUTION INTRAMUSCULAR; INTRAVENOUS; SUBCUTANEOUS at 10:53

## 2023-11-15 RX ADMIN — GABAPENTIN 600 MG: 300 CAPSULE ORAL at 07:28

## 2023-11-15 RX ADMIN — EPHEDRINE SULFATE 10 MG: 50 INJECTION, SOLUTION INTRAVENOUS at 08:31

## 2023-11-15 RX ADMIN — ONDANSETRON 4 MG: 2 INJECTION INTRAMUSCULAR; INTRAVENOUS at 09:20

## 2023-11-15 RX ADMIN — SODIUM CHLORIDE, SODIUM LACTATE, POTASSIUM CHLORIDE, AND CALCIUM CHLORIDE 150 ML/HR: 600; 310; 30; 20 INJECTION, SOLUTION INTRAVENOUS at 07:28

## 2023-11-15 RX ADMIN — SODIUM CHLORIDE, SODIUM LACTATE, POTASSIUM CHLORIDE, AND CALCIUM CHLORIDE: 600; 310; 30; 20 INJECTION, SOLUTION INTRAVENOUS at 08:54

## 2023-11-15 RX ADMIN — FENTANYL CITRATE 50 MCG: 50 INJECTION, SOLUTION INTRAMUSCULAR; INTRAVENOUS at 08:48

## 2023-11-15 RX ADMIN — LIDOCAINE HYDROCHLORIDE 80 MG: 20 INJECTION INTRAVENOUS at 08:05

## 2023-11-15 RX ADMIN — MEPERIDINE HYDROCHLORIDE 25 MG: 25 INJECTION INTRAMUSCULAR; INTRAVENOUS; SUBCUTANEOUS at 09:32

## 2023-11-15 RX ADMIN — ROCURONIUM BROMIDE 40 MG: 10 INJECTION, SOLUTION INTRAVENOUS at 08:19

## 2023-11-15 RX ADMIN — FENTANYL CITRATE 50 MCG: 50 INJECTION, SOLUTION INTRAMUSCULAR; INTRAVENOUS at 08:05

## 2023-11-15 RX ADMIN — ROCURONIUM BROMIDE 10 MG: 10 INJECTION, SOLUTION INTRAVENOUS at 08:05

## 2023-11-15 RX ADMIN — ROCURONIUM BROMIDE 10 MG: 10 INJECTION, SOLUTION INTRAVENOUS at 08:48

## 2023-11-15 RX ADMIN — SCOPALAMINE 1 PATCH: 1 PATCH, EXTENDED RELEASE TRANSDERMAL at 07:28

## 2023-11-15 RX ADMIN — DEXMEDETOMIDINE HCL 8 MCG: 100 INJECTION INTRAVENOUS at 08:25

## 2023-11-15 RX ADMIN — PROPOFOL 200 MG: 10 INJECTION, EMULSION INTRAVENOUS at 08:05

## 2023-11-15 SDOH — ECONOMIC STABILITY: INCOME INSECURITY: IN THE PAST 12 MONTHS, HAS THE ELECTRIC, GAS, OIL, OR WATER COMPANY THREATENED TO SHUT OFF SERVICE IN YOUR HOME?: NO

## 2023-11-15 SDOH — SOCIAL STABILITY: SOCIAL INSECURITY: ARE THERE ANY APPARENT SIGNS OF INJURIES/BEHAVIORS THAT COULD BE RELATED TO ABUSE/NEGLECT?: NO

## 2023-11-15 SDOH — SOCIAL STABILITY: SOCIAL INSECURITY: HAS ANYONE EVER THREATENED TO HURT YOUR FAMILY OR YOUR PETS?: NO

## 2023-11-15 SDOH — HEALTH STABILITY: MENTAL HEALTH: HOW OFTEN DO YOU HAVE 6 OR MORE DRINKS ON ONE OCCASION?: NEVER

## 2023-11-15 SDOH — SOCIAL STABILITY: SOCIAL INSECURITY: ARE YOU OR HAVE YOU BEEN THREATENED OR ABUSED PHYSICALLY, EMOTIONALLY, OR SEXUALLY BY ANYONE?: NO

## 2023-11-15 SDOH — HEALTH STABILITY: MENTAL HEALTH: HOW MANY STANDARD DRINKS CONTAINING ALCOHOL DO YOU HAVE ON A TYPICAL DAY?: PATIENT DOES NOT DRINK

## 2023-11-15 SDOH — SOCIAL STABILITY: SOCIAL INSECURITY: HAVE YOU HAD THOUGHTS OF HARMING ANYONE ELSE?: NO

## 2023-11-15 SDOH — HEALTH STABILITY: MENTAL HEALTH: HOW OFTEN DO YOU HAVE A DRINK CONTAINING ALCOHOL?: NEVER

## 2023-11-15 SDOH — SOCIAL STABILITY: SOCIAL INSECURITY: DO YOU FEEL UNSAFE GOING BACK TO THE PLACE WHERE YOU ARE LIVING?: NO

## 2023-11-15 SDOH — HEALTH STABILITY: MENTAL HEALTH: CURRENT SMOKER: 0

## 2023-11-15 SDOH — SOCIAL STABILITY: SOCIAL INSECURITY: DOES ANYONE TRY TO KEEP YOU FROM HAVING/CONTACTING OTHER FRIENDS OR DOING THINGS OUTSIDE YOUR HOME?: NO

## 2023-11-15 SDOH — SOCIAL STABILITY: SOCIAL INSECURITY: ABUSE: ADULT

## 2023-11-15 SDOH — SOCIAL STABILITY: SOCIAL INSECURITY: DO YOU FEEL ANYONE HAS EXPLOITED OR TAKEN ADVANTAGE OF YOU FINANCIALLY OR OF YOUR PERSONAL PROPERTY?: NO

## 2023-11-15 SDOH — SOCIAL STABILITY: SOCIAL INSECURITY: WERE YOU ABLE TO COMPLETE ALL THE BEHAVIORAL HEALTH SCREENINGS?: YES

## 2023-11-15 ASSESSMENT — PAIN SCALES - GENERAL
PAINLEVEL_OUTOF10: 5 - MODERATE PAIN
PAIN_LEVEL: 0
PAINLEVEL_OUTOF10: 0 - NO PAIN
PAINLEVEL_OUTOF10: 0 - NO PAIN
PAINLEVEL_OUTOF10: 3
PAINLEVEL_OUTOF10: 0 - NO PAIN
PAINLEVEL_OUTOF10: 0 - NO PAIN
PAINLEVEL_OUTOF10: 3
PAINLEVEL_OUTOF10: 4
PAINLEVEL_OUTOF10: 6
PAINLEVEL_OUTOF10: 0 - NO PAIN
PAINLEVEL_OUTOF10: 7
PAINLEVEL_OUTOF10: 3

## 2023-11-15 ASSESSMENT — LIFESTYLE VARIABLES
HOW MANY STANDARD DRINKS CONTAINING ALCOHOL DO YOU HAVE ON A TYPICAL DAY: PATIENT DOES NOT DRINK
AUDIT-C TOTAL SCORE: 0
AUDIT-C TOTAL SCORE: 0
SUBSTANCE_ABUSE_PAST_12_MONTHS: NO
SKIP TO QUESTIONS 9-10: 1
HOW OFTEN DO YOU HAVE A DRINK CONTAINING ALCOHOL: NEVER
HOW OFTEN DO YOU HAVE 6 OR MORE DRINKS ON ONE OCCASION: NEVER
AUDIT-C TOTAL SCORE: 0
PRESCIPTION_ABUSE_PAST_12_MONTHS: NO
SKIP TO QUESTIONS 9-10: 1

## 2023-11-15 ASSESSMENT — PAIN - FUNCTIONAL ASSESSMENT
PAIN_FUNCTIONAL_ASSESSMENT: 0-10

## 2023-11-15 ASSESSMENT — COGNITIVE AND FUNCTIONAL STATUS - GENERAL
MOVING TO AND FROM BED TO CHAIR: A LITTLE
PATIENT BASELINE BEDBOUND: NO
DRESSING REGULAR LOWER BODY CLOTHING: A LITTLE
MOVING FROM LYING ON BACK TO SITTING ON SIDE OF FLAT BED WITH BEDRAILS: A LITTLE
MOBILITY SCORE: 18
TURNING FROM BACK TO SIDE WHILE IN FLAT BAD: A LITTLE
STANDING UP FROM CHAIR USING ARMS: A LITTLE
DAILY ACTIVITIY SCORE: 22
CLIMB 3 TO 5 STEPS WITH RAILING: A LITTLE
HELP NEEDED FOR BATHING: A LITTLE
WALKING IN HOSPITAL ROOM: A LITTLE

## 2023-11-15 ASSESSMENT — ACTIVITIES OF DAILY LIVING (ADL)
BATHING: NEEDS ASSISTANCE
WALKS IN HOME: INDEPENDENT
TOILETING: NEEDS ASSISTANCE
ADEQUATE_TO_COMPLETE_ADL: YES
LACK_OF_TRANSPORTATION: NO
HEARING - RIGHT EAR: FUNCTIONAL
FEEDING YOURSELF: INDEPENDENT
PATIENT'S MEMORY ADEQUATE TO SAFELY COMPLETE DAILY ACTIVITIES?: YES
JUDGMENT_ADEQUATE_SAFELY_COMPLETE_DAILY_ACTIVITIES: YES
HEARING - LEFT EAR: FUNCTIONAL
GROOMING: NEEDS ASSISTANCE
DRESSING YOURSELF: NEEDS ASSISTANCE

## 2023-11-15 ASSESSMENT — PAIN DESCRIPTION - LOCATION: LOCATION: ABDOMEN

## 2023-11-15 ASSESSMENT — COLUMBIA-SUICIDE SEVERITY RATING SCALE - C-SSRS
2. HAVE YOU ACTUALLY HAD ANY THOUGHTS OF KILLING YOURSELF?: NO
6. HAVE YOU EVER DONE ANYTHING, STARTED TO DO ANYTHING, OR PREPARED TO DO ANYTHING TO END YOUR LIFE?: NO
1. IN THE PAST MONTH, HAVE YOU WISHED YOU WERE DEAD OR WISHED YOU COULD GO TO SLEEP AND NOT WAKE UP?: NO

## 2023-11-15 ASSESSMENT — PAIN SCALES - PAIN ASSESSMENT IN ADVANCED DEMENTIA (PAINAD)
BREATHING: NORMAL

## 2023-11-15 ASSESSMENT — PATIENT HEALTH QUESTIONNAIRE - PHQ9
1. LITTLE INTEREST OR PLEASURE IN DOING THINGS: NOT AT ALL
SUM OF ALL RESPONSES TO PHQ9 QUESTIONS 1 & 2: 0
2. FEELING DOWN, DEPRESSED OR HOPELESS: NOT AT ALL

## 2023-11-15 NOTE — OP NOTE
LAPAROSCOPIC HIATAL HERNIA REPAIR/ GASTRC BYPASS / EGD / TAP BLOCK Operative Note     Date: 11/15/2023  OR Location: [facility] OR    Name: Juana Christianson : 1980, Age: 43 y.o., MRN: [id number], Sex: female    Diagnosis  Pre-op Diagnosis     * Diaphragmatic hernia without obstruction or gangrene [K44.9] Post-op Diagnosis     * Diaphragmatic hernia without obstruction or gangrene [K44.9]     Procedures    * LAPAROSCOPIC HIATAL HERNIA REPAIR/ GASTRC BYPASS / EGD / TAP BLOCK    Surgeons      * Riaz Contreras - Primary    Resident/Fellow/Other Assistant:  Surgeon(s) and Role:    Procedure Summary  Anesthesia: General  ASA: III  Anesthesia Staff: Anesthesiologist: Colton Arrieta MD  C-AA: CHARIS Gallegos  Estimated Blood Loss: 10 mL  Intra-op Medications:   Medication Name Total Dose   bupivacaine PF (Marcaine) 0.25 % (2.5 mg/mL) injection 50 mL   cloNIDine PF (Duraclon) injection 2 mL   lactated Ringer's infusion Cannot be calculated   ceFAZolin in dextrose (iso-os) (Ancef) IVPB 2 g 2 g              Anesthesia Record               Intraprocedure I/O Totals          Intake    Propofol Drip 0.00 mL    The total shown is the total volume documented since Anesthesia Start was filed.    lactated Ringer's infusion 1200.00 mL    ceFAZolin in dextrose (iso-os) (Ancef) IVPB 2 g 100.00 mL    Total Intake 1300 mL          Specimen: No specimens collected     Staff:   Circulator: Vickie Busch RN; Maine Busch RN; Nicole Sahu RN  Scrub Person: Zeny Suárez; Colton Palmer         Drains and/or Catheters: * None in log *    Tourniquet Times:         Implants:     Findings: Hiatal hernia, twisted sleeve , negative leak test     Indications: Juana Christianson is an 43 y.o. female who is having surgery for Diaphragmatic hernia without obstruction or gangrene [K44.9]. Sleeve angulation, GERD, Dysphagia     The patient was seen in the preoperative area. The risks, benefits, complications, treatment options, non-operative  alternatives, expected recovery and outcomes were discussed with the patient. The possibilities of reaction to medication, pulmonary aspiration, injury to surrounding structures, bleeding, recurrent infection, the need for additional procedures, failure to diagnose a condition, and creating a complication requiring transfusion or operation were discussed with the patient. The patient concurred with the proposed plan, giving informed consent.  The site of surgery was properly noted/marked if necessary per policy. The patient has been actively warmed in preoperative area. Preoperative antibiotics have been ordered and given within 1 hours of incision. Venous thrombosis prophylaxis have been ordered including bilateral sequential compression devices and chemical prophylaxis    Procedure Details: Patient with history of sleeve gastrectomy and developed severe GERD, dysphagia issues, underwent extensive assessments which revealed hiatal hernia and an angulated sleeve. We discussed at length, hiatal hernia repair alone most likely would not resolve the symptoms due to sleeve morphology and a better option would be acid diversion Kevin Karthik reconstruction. Patient was scheduled for said procedures and on the day of surgery after verification of informed consent she was given subcu heparin and taken to the operating room, placed in supine position on the table, SCDs were placed, timeout was done, antibiotics were administered, general anesthesia was established, standard, sterile preparation and draping of abdominal wall was performed.  Entry into abdominal cavity was obtained using Visiport device through inferior aspect of umbilicus using fascial cut down under direct visison. Local anesthetic was injected in tap block fashion. 12 mm port was placed in the right midclavicular and left midclavicular lines and additional 5 mm ports were placed in the upper quadrants on each side.  Adhesions of the omentum were taken down with  LigaSure device.  Left lobe was retracted with Kenia retractor. Right eugenia was identified dissection was continued medial to right eugenia.  Dissection continued anteriorly and then onto the left side.  The herniating GE junction and gastric sleeve was reduced back into abdominal cavity then posterior dissection was completed then distal esophagus was circumferentially dissected for many centimeters ~3 cm of intra-abdominal esophageal length was obtained. Part of the hernia sac was resected.  Hiatal hernia repair was done now using 0 Surgidek sutures using Endo Stitch device in interrupted figure-of-eight fashion.  Sleeve shape became emely more accentuated after reduction of hiatal hernia with more exaggerated angulation now. This clearly would have resulted in worsening symptoms. We decided to proceed with acid diversion. Lesser sac was entered 5 cm from the GE junction area using perigastric blunt dissection and sleeve was divided 4 to 5 cm from GE junction area using 60 mm purple load with seamguard.  Now ligament of Treitz was identified proximal jejunum was measured to 30 cm divided with 60 mm tan stapler, mesentery was divided with LigaSure, the Kevin limb was measured to 100 cm placed side-to-side with biliopancreatic limb.  Enterotomies were made in the biliopancreatic and Kevin limbs and a triple stapled jejunojejunostomy was created without technical problems and mesentery defect was closed with running 2-0 Surgidac.  Kevin limb was opened at the staple proximal end with LigaSure and an orvil was inserted by the anesthesiologist into the gastric pouch and plastic tubing was removed from the abdominal cavity after satisfactory placement of the anvil through the gastrotomy in the gastric pouch.  25/3.5 EEA stapler was inserted through the left midclavicular incision after extending it and through the wound protector.  A gastrojejunostomy was created without technical problems.  Redundant end of the candycane  was resected with 60 mm tan stapler.  Gastroscopy was performed.  There was no evidence of any air bubbles there was no active bleeding, anastomosis was satisfactory.  There was no residual hiatal hernia. There was no active bleeding.  Frank space defect was closed with a running 2-0 Surgidac in pursestring fashion.  After final satisfactory examination abdominal cavity trocars were removed fascial defect at the 12 mm port sites were closed with Endo passer under laparoscopic vision using 0 Vicryl sutures.  Skin was closed with 3-0 Monocryl.  LiquiBand was applied.  Patient tolerated the operation well, was extubated in the OR transferred recovery room in stable condition.    Complications:  None; patient tolerated the procedure well.    Disposition: PACU - hemodynamically stable.  Condition: stable         Additional Details: Dr. Lorenz was scrubbed and assisted in entire case, no qualified resident was available for this advance laparoscopic case.     Attending Attestation: I was present and scrubbed for the entire procedure.    Riaz Contreras  Phone Number: [phone]

## 2023-11-15 NOTE — ANESTHESIA PREPROCEDURE EVALUATION
Patient: Juana Christianson    Procedure Information       Anesthesia Start Date/Time: 11/15/23 0758    Procedure: LAPAROSCOPIC HIATAL HERNIA REPAIR/ GASTRC BYPASS / EGD / TAP BLOCK (Abdomen)    Location: [facility] OR 09 / [facility]    Surgeons: Riaz Contreras MD            Relevant Problems   Endocrine   (+) Morbid obesity (CMS/HCC)      GI   (+) Chronic gastroesophageal reflux disease   (+) GERD (gastroesophageal reflux disease)   (+) Hiatal hernia      Neuro/Psych   (+) Carpal tunnel syndrome      Musculoskeletal   (+) Carpal tunnel syndrome       Clinical information reviewed:    Allergies  Meds     OB Status           NPO Detail:  NPO/Void Status  Date of Last Liquid: 11/14/23  Time of Last Liquid: 2000  Date of Last Solid: 11/14/23  Time of Last Solid: 2000         Physical Exam    Airway  Mallampati: I  TM distance: >3 FB  Neck ROM: full     Cardiovascular   Rhythm: regular  Rate: normal     Dental - normal exam  (+) upper dentures     Pulmonary   Breath sounds clear to auscultation     Abdominal        Anesthesia Plan    ASA 3     general     The patient is not a current smoker.  Patient was not previously instructed to abstain from smoking on day of procedure.  Patient did not smoke on day of procedure.    intravenous induction   Postoperative administration of opioids is intended.  Anesthetic plan and risks discussed with patient.  Use of blood products discussed with patient who consented to blood products.    Plan discussed with CRNA.

## 2023-11-15 NOTE — ANESTHESIA POSTPROCEDURE EVALUATION
Patient: Juana Christianson    Procedure Summary       Date: 11/15/23 Room / Location: [facility] 09 / [facility]    Anesthesia Start: 0758 Anesthesia Stop: 1006    Procedure: LAPAROSCOPIC HIATAL HERNIA REPAIR/ GASTRC BYPASS / EGD / TAP BLOCK (Abdomen) Diagnosis:       Diaphragmatic hernia without obstruction or gangrene      (Diaphragmatic hernia without obstruction or gangrene [K44.9])    Surgeons: Riaz Contreras MD Responsible Provider: Colton Arrieta MD    Anesthesia Type: general ASA Status: 3            Anesthesia Type: general    Vitals Value Taken Time   /62 11/15/23 1005   Temp 36.2 °C (97.2 °F) 11/15/23 1005   Pulse 65 11/15/23 1005   Resp 14 11/15/23 1005   SpO2 98 % 11/15/23 1005       Anesthesia Post Evaluation    Patient location during evaluation: PACU  Patient participation: complete - patient participated  Level of consciousness: responsive to verbal stimuli and sleepy but conscious  Pain score: 0  Pain management: adequate  Airway patency: patent  Cardiovascular status: acceptable  Respiratory status: acceptable  Hydration status: acceptable  Postoperative Nausea and Vomiting: none        There were no known notable events for this encounter.

## 2023-11-15 NOTE — ANESTHESIA PROCEDURE NOTES
Airway  Date/Time: 11/15/2023 8:07 AM  Urgency: elective    Airway not difficult    Staffing  Performed: resident   Authorized by: Colton Arrieta MD    Performed by: CHARIS Gallegos  Patient location during procedure: OR    Indications and Patient Condition  Indications for airway management: anesthesia and airway protection  Spontaneous ventilation: present  Sedation level: deep  Preoxygenated: yes  Patient position: sniffing  MILS maintained throughout  Mask difficulty assessment: 1 - vent by mask  Planned trial extubation    Final Airway Details  Final airway type: endotracheal airway      Successful airway: ETT  Cuffed: yes   Successful intubation technique: video laryngoscopy  Facilitating devices/methods: intubating stylet  Endotracheal tube insertion site: oral  Blade: Stan  Blade size: #4  ETT size (mm): 7.0  Cormack-Lehane Classification: grade I - full view of glottis  Placement verified by: capnometry   Cuff volume (mL): 8  Measured from: lips  ETT to lips (cm): 22  Number of attempts at approach: 1    Additional Comments  Airway management by Dr. Arrieta and respiratory therapy student. Easy BMV, easy intubation with elective glidescope. Glidescope used for teaching purposes. Lips and teeth in preanesthetic condition.

## 2023-11-16 ENCOUNTER — APPOINTMENT (OUTPATIENT)
Dept: RADIOLOGY | Facility: HOSPITAL | Age: 43
DRG: 328 | End: 2023-11-16
Payer: COMMERCIAL

## 2023-11-16 LAB
ALBUMIN SERPL BCP-MCNC: 3.8 G/DL (ref 3.4–5)
ALP SERPL-CCNC: 41 U/L (ref 33–110)
ALT SERPL W P-5'-P-CCNC: 24 U/L (ref 7–45)
ANION GAP SERPL CALC-SCNC: 12 MMOL/L (ref 10–20)
AST SERPL W P-5'-P-CCNC: 26 U/L (ref 9–39)
BASOPHILS # BLD AUTO: 0.01 X10*3/UL (ref 0–0.1)
BASOPHILS NFR BLD AUTO: 0.1 %
BILIRUB SERPL-MCNC: 0.9 MG/DL (ref 0–1.2)
BUN SERPL-MCNC: 11 MG/DL (ref 6–23)
CALCIUM SERPL-MCNC: 9 MG/DL (ref 8.6–10.3)
CHLORIDE SERPL-SCNC: 104 MMOL/L (ref 98–107)
CO2 SERPL-SCNC: 26 MMOL/L (ref 21–32)
CREAT SERPL-MCNC: 0.74 MG/DL (ref 0.5–1.05)
EOSINOPHIL # BLD AUTO: 0.02 X10*3/UL (ref 0–0.7)
EOSINOPHIL NFR BLD AUTO: 0.2 %
ERYTHROCYTE [DISTWIDTH] IN BLOOD BY AUTOMATED COUNT: 13.1 % (ref 11.5–14.5)
GFR SERPL CREATININE-BSD FRML MDRD: >90 ML/MIN/1.73M*2
GLUCOSE SERPL-MCNC: 90 MG/DL (ref 74–99)
HCT VFR BLD AUTO: 36.8 % (ref 36–46)
HGB BLD-MCNC: 12.8 G/DL (ref 12–16)
IMM GRANULOCYTES # BLD AUTO: 0.03 X10*3/UL (ref 0–0.7)
IMM GRANULOCYTES NFR BLD AUTO: 0.3 % (ref 0–0.9)
LYMPHOCYTES # BLD AUTO: 1.92 X10*3/UL (ref 1.2–4.8)
LYMPHOCYTES NFR BLD AUTO: 21 %
MCH RBC QN AUTO: 31.8 PG (ref 26–34)
MCHC RBC AUTO-ENTMCNC: 34.8 G/DL (ref 32–36)
MCV RBC AUTO: 91 FL (ref 80–100)
MONOCYTES # BLD AUTO: 0.65 X10*3/UL (ref 0.1–1)
MONOCYTES NFR BLD AUTO: 7.1 %
NEUTROPHILS # BLD AUTO: 6.5 X10*3/UL (ref 1.2–7.7)
NEUTROPHILS NFR BLD AUTO: 71.3 %
NRBC BLD-RTO: 0 /100 WBCS (ref 0–0)
PLATELET # BLD AUTO: 245 X10*3/UL (ref 150–450)
POTASSIUM SERPL-SCNC: 4.1 MMOL/L (ref 3.5–5.3)
PROT SERPL-MCNC: 5.9 G/DL (ref 6.4–8.2)
RBC # BLD AUTO: 4.03 X10*6/UL (ref 4–5.2)
SODIUM SERPL-SCNC: 138 MMOL/L (ref 136–145)
WBC # BLD AUTO: 9.1 X10*3/UL (ref 4.4–11.3)

## 2023-11-16 PROCEDURE — 1100000001 HC PRIVATE ROOM DAILY

## 2023-11-16 PROCEDURE — 80053 COMPREHEN METABOLIC PANEL: CPT | Performed by: STUDENT IN AN ORGANIZED HEALTH CARE EDUCATION/TRAINING PROGRAM

## 2023-11-16 PROCEDURE — 85025 COMPLETE CBC W/AUTO DIFF WBC: CPT | Performed by: STUDENT IN AN ORGANIZED HEALTH CARE EDUCATION/TRAINING PROGRAM

## 2023-11-16 PROCEDURE — 2500000001 HC RX 250 WO HCPCS SELF ADMINISTERED DRUGS (ALT 637 FOR MEDICARE OP): Performed by: STUDENT IN AN ORGANIZED HEALTH CARE EDUCATION/TRAINING PROGRAM

## 2023-11-16 PROCEDURE — 2500000004 HC RX 250 GENERAL PHARMACY W/ HCPCS (ALT 636 FOR OP/ED): Performed by: STUDENT IN AN ORGANIZED HEALTH CARE EDUCATION/TRAINING PROGRAM

## 2023-11-16 PROCEDURE — 2500000005 HC RX 250 GENERAL PHARMACY W/O HCPCS: Performed by: STUDENT IN AN ORGANIZED HEALTH CARE EDUCATION/TRAINING PROGRAM

## 2023-11-16 PROCEDURE — 74240 X-RAY XM UPR GI TRC 1CNTRST: CPT | Performed by: RADIOLOGY

## 2023-11-16 PROCEDURE — C9113 INJ PANTOPRAZOLE SODIUM, VIA: HCPCS | Performed by: STUDENT IN AN ORGANIZED HEALTH CARE EDUCATION/TRAINING PROGRAM

## 2023-11-16 PROCEDURE — 74240 X-RAY XM UPR GI TRC 1CNTRST: CPT | Mod: FY

## 2023-11-16 PROCEDURE — 36415 COLL VENOUS BLD VENIPUNCTURE: CPT | Performed by: STUDENT IN AN ORGANIZED HEALTH CARE EDUCATION/TRAINING PROGRAM

## 2023-11-16 RX ADMIN — ACETAMINOPHEN 650 MG: 160 SOLUTION ORAL at 09:43

## 2023-11-16 RX ADMIN — GABAPENTIN 100 MG: 250 SOLUTION ORAL at 21:41

## 2023-11-16 RX ADMIN — LIDOCAINE 1 PATCH: 4 PATCH TOPICAL at 14:28

## 2023-11-16 RX ADMIN — CEFAZOLIN SODIUM 2 G: 2 INJECTION, SOLUTION INTRAVENOUS at 03:02

## 2023-11-16 RX ADMIN — KETOROLAC TROMETHAMINE 15 MG: 30 INJECTION, SOLUTION INTRAMUSCULAR; INTRAVENOUS at 11:21

## 2023-11-16 RX ADMIN — DOCUSATE SODIUM LIQUID 100 MG: 100 LIQUID ORAL at 21:41

## 2023-11-16 RX ADMIN — ACETAMINOPHEN 650 MG: 160 SOLUTION ORAL at 23:44

## 2023-11-16 RX ADMIN — ACETAMINOPHEN 650 MG: 160 SOLUTION ORAL at 03:02

## 2023-11-16 RX ADMIN — ACETAMINOPHEN 650 MG: 160 SOLUTION ORAL at 17:05

## 2023-11-16 RX ADMIN — SODIUM CHLORIDE, POTASSIUM CHLORIDE, SODIUM LACTATE AND CALCIUM CHLORIDE 100 ML/HR: 600; 310; 30; 20 INJECTION, SOLUTION INTRAVENOUS at 03:58

## 2023-11-16 RX ADMIN — KETOROLAC TROMETHAMINE 15 MG: 30 INJECTION, SOLUTION INTRAMUSCULAR; INTRAVENOUS at 17:57

## 2023-11-16 RX ADMIN — ACETAMINOPHEN 650 MG: 160 SOLUTION ORAL at 11:22

## 2023-11-16 RX ADMIN — SODIUM CHLORIDE, POTASSIUM CHLORIDE, SODIUM LACTATE AND CALCIUM CHLORIDE 100 ML/HR: 600; 310; 30; 20 INJECTION, SOLUTION INTRAVENOUS at 23:43

## 2023-11-16 RX ADMIN — KETOROLAC TROMETHAMINE 15 MG: 30 INJECTION, SOLUTION INTRAMUSCULAR; INTRAVENOUS at 03:03

## 2023-11-16 RX ADMIN — GABAPENTIN 100 MG: 250 SOLUTION ORAL at 09:42

## 2023-11-16 RX ADMIN — OXYCODONE HYDROCHLORIDE 5 MG: 5 SOLUTION ORAL at 21:44

## 2023-11-16 RX ADMIN — PANTOPRAZOLE SODIUM 40 MG: 40 INJECTION, POWDER, FOR SOLUTION INTRAVENOUS at 06:34

## 2023-11-16 ASSESSMENT — PAIN SCALES - GENERAL
PAINLEVEL_OUTOF10: 6
PAINLEVEL_OUTOF10: 4
PAINLEVEL_OUTOF10: 4

## 2023-11-16 ASSESSMENT — PAIN - FUNCTIONAL ASSESSMENT
PAIN_FUNCTIONAL_ASSESSMENT: 0-10
PAIN_FUNCTIONAL_ASSESSMENT: 0-10

## 2023-11-16 NOTE — PROGRESS NOTES
"Juana Christianson is a 43 y.o. female on day 1 of admission presenting with Chronic gastroesophageal reflux disease.    Subjective   Patient seen and examined at the bedside. No acute events overnight. Patient denies fevers, chills, chest pain, shortness of breath, or diffuse abdominal pain. Tolerated fluids orally. Patient is ambulating, voiding, and using incentive spirometry. Pain and nausea well controlled with meds.              Objective     Physical Exam  Physical Exam:   Constitutional: no distress, alert and cooperative  Eyes: EOMI, clear sclera  Head/Neck: Neck supple, no apparent injury, No JVD, trachea midline  Respiratory/Thorax: good chest expansion, thorax symmetric  Cardiovascular: Regular heart rate  Abdominal: Incisions intact and clean, nondistended, soft, nontender   Musculoskeletal: ROM intact, no joint swelling   Extremities: Grossly normal extremities   Neurological: alert and oriented x3, intact senses  Psychological: Appropriate mood and behavior  Skin: Warm and dry, no lesions, no rashes    Last Recorded Vitals  Blood pressure 129/81, pulse 59, temperature 36 °C (96.8 °F), temperature source Temporal, resp. rate 18, height 1.575 m (5' 2.01\"), weight 64.4 kg (141 lb 15.6 oz), SpO2 96 %.  Intake/Output last 3 Shifts:  I/O last 3 completed shifts:  In: 3607.5 (56 mL/kg) [I.V.:3307.5 (51.4 mL/kg); IV Piggyback:300]  Out: 600 (9.3 mL/kg) [Urine:600 (0.3 mL/kg/hr)]  Weight: 64.4 kg     Relevant Results                             Assessment/Plan   Principal Problem:    Chronic gastroesophageal reflux disease    Pt is a 44 y/o female with severe reflux who underwent hiatal hernia repair with acid diverting bypass     - Upper GI contrast study today to assess for leak or obstruction  - Advance diet to include full liquids if esophagram does not show leak or obstruction  - Zofran every 6 hours for nausea  - Protonix 40mg IV daily  - Tylenol and oxycodone for pain. Dilaudid as needed for breakthrough " pain  - IV fluid support with lactated ringers  - Encourage IS/Ambulation  - Begin discharge planning         I spent 25 minutes in the professional and overall care of this patient.      Delvin Lorenz MD

## 2023-11-16 NOTE — CONSULTS
Education Documentation  Nutrition Care Manual, taught by Olga Smith RD, LD at 11/16/2023  4:05 PM.  Learner: Family, Patient  Readiness: Acceptance  Method: Explanation  Response: Verbalizes Understanding  Comment: Post-op bariatric diet education provided per protocol. See RD note for detials of education.        Consult received for bariatric diet instructions.  Pt. is s/p Kevin-en-Y gastric bypass and hernia repair.  Pt. is demonstrating proper completion of hydration monitoring worksheet per bariatric guidelines/protocol.   Reviewed importance of adequate hydration during recovery process.  Pt. had good understanding of this concept.  Pt following with a bariatric RD.

## 2023-11-16 NOTE — PROGRESS NOTES
11/16/23 1128   Discharge Planning   Living Arrangements Children   Support Systems Children   Assistance Needed none PTA   Type of Residence Private residence   Home or Post Acute Services None   Patient expects to be discharged to: HOME   Does the patient need discharge transport arranged? No     Prefers HOME.

## 2023-11-16 NOTE — CARE PLAN
The patient's goals for the shift include      The clinical goals for the shift include pain management    Problem: Pain - Adult  Goal: Verbalizes/displays adequate comfort level or baseline comfort level  Outcome: Progressing     Problem: Safety - Adult  Goal: Free from fall injury  Outcome: Progressing     Problem: Pain  Goal: Takes deep breaths with improved pain control throughout the shift  Outcome: Progressing

## 2023-11-17 VITALS
OXYGEN SATURATION: 94 % | BODY MASS INDEX: 26.13 KG/M2 | RESPIRATION RATE: 18 BRPM | HEIGHT: 62 IN | SYSTOLIC BLOOD PRESSURE: 156 MMHG | HEART RATE: 70 BPM | DIASTOLIC BLOOD PRESSURE: 95 MMHG | TEMPERATURE: 97.5 F | WEIGHT: 141.98 LBS

## 2023-11-17 LAB
ALBUMIN SERPL BCP-MCNC: 3.5 G/DL (ref 3.4–5)
ALP SERPL-CCNC: 37 U/L (ref 33–110)
ALT SERPL W P-5'-P-CCNC: 14 U/L (ref 7–45)
ANION GAP SERPL CALC-SCNC: 10 MMOL/L (ref 10–20)
AST SERPL W P-5'-P-CCNC: 19 U/L (ref 9–39)
BASOPHILS # BLD AUTO: 0.04 X10*3/UL (ref 0–0.1)
BASOPHILS NFR BLD AUTO: 0.6 %
BILIRUB SERPL-MCNC: 0.9 MG/DL (ref 0–1.2)
BUN SERPL-MCNC: 10 MG/DL (ref 6–23)
CALCIUM SERPL-MCNC: 8.6 MG/DL (ref 8.6–10.3)
CHLORIDE SERPL-SCNC: 106 MMOL/L (ref 98–107)
CO2 SERPL-SCNC: 26 MMOL/L (ref 21–32)
CREAT SERPL-MCNC: 0.54 MG/DL (ref 0.5–1.05)
EOSINOPHIL # BLD AUTO: 0.1 X10*3/UL (ref 0–0.7)
EOSINOPHIL NFR BLD AUTO: 1.4 %
ERYTHROCYTE [DISTWIDTH] IN BLOOD BY AUTOMATED COUNT: 13.2 % (ref 11.5–14.5)
GFR SERPL CREATININE-BSD FRML MDRD: >90 ML/MIN/1.73M*2
GLUCOSE SERPL-MCNC: 84 MG/DL (ref 74–99)
HCT VFR BLD AUTO: 33.7 % (ref 36–46)
HGB BLD-MCNC: 11.5 G/DL (ref 12–16)
IMM GRANULOCYTES # BLD AUTO: 0.03 X10*3/UL (ref 0–0.7)
IMM GRANULOCYTES NFR BLD AUTO: 0.4 % (ref 0–0.9)
LYMPHOCYTES # BLD AUTO: 1.42 X10*3/UL (ref 1.2–4.8)
LYMPHOCYTES NFR BLD AUTO: 20 %
MCH RBC QN AUTO: 31.5 PG (ref 26–34)
MCHC RBC AUTO-ENTMCNC: 34.1 G/DL (ref 32–36)
MCV RBC AUTO: 92 FL (ref 80–100)
MONOCYTES # BLD AUTO: 0.54 X10*3/UL (ref 0.1–1)
MONOCYTES NFR BLD AUTO: 7.6 %
NEUTROPHILS # BLD AUTO: 4.97 X10*3/UL (ref 1.2–7.7)
NEUTROPHILS NFR BLD AUTO: 70 %
NRBC BLD-RTO: 0 /100 WBCS (ref 0–0)
PLATELET # BLD AUTO: 206 X10*3/UL (ref 150–450)
POTASSIUM SERPL-SCNC: 4 MMOL/L (ref 3.5–5.3)
PROT SERPL-MCNC: 5.7 G/DL (ref 6.4–8.2)
RBC # BLD AUTO: 3.65 X10*6/UL (ref 4–5.2)
SODIUM SERPL-SCNC: 138 MMOL/L (ref 136–145)
WBC # BLD AUTO: 7.1 X10*3/UL (ref 4.4–11.3)

## 2023-11-17 PROCEDURE — 2500000004 HC RX 250 GENERAL PHARMACY W/ HCPCS (ALT 636 FOR OP/ED): Performed by: STUDENT IN AN ORGANIZED HEALTH CARE EDUCATION/TRAINING PROGRAM

## 2023-11-17 PROCEDURE — C9113 INJ PANTOPRAZOLE SODIUM, VIA: HCPCS | Performed by: STUDENT IN AN ORGANIZED HEALTH CARE EDUCATION/TRAINING PROGRAM

## 2023-11-17 PROCEDURE — 80053 COMPREHEN METABOLIC PANEL: CPT | Performed by: STUDENT IN AN ORGANIZED HEALTH CARE EDUCATION/TRAINING PROGRAM

## 2023-11-17 PROCEDURE — 85025 COMPLETE CBC W/AUTO DIFF WBC: CPT | Performed by: STUDENT IN AN ORGANIZED HEALTH CARE EDUCATION/TRAINING PROGRAM

## 2023-11-17 PROCEDURE — 36415 COLL VENOUS BLD VENIPUNCTURE: CPT | Performed by: STUDENT IN AN ORGANIZED HEALTH CARE EDUCATION/TRAINING PROGRAM

## 2023-11-17 PROCEDURE — 2500000005 HC RX 250 GENERAL PHARMACY W/O HCPCS: Performed by: STUDENT IN AN ORGANIZED HEALTH CARE EDUCATION/TRAINING PROGRAM

## 2023-11-17 PROCEDURE — 2500000001 HC RX 250 WO HCPCS SELF ADMINISTERED DRUGS (ALT 637 FOR MEDICARE OP): Performed by: STUDENT IN AN ORGANIZED HEALTH CARE EDUCATION/TRAINING PROGRAM

## 2023-11-17 RX ADMIN — ACETAMINOPHEN 650 MG: 160 SOLUTION ORAL at 05:10

## 2023-11-17 RX ADMIN — ACETAMINOPHEN 650 MG: 160 SOLUTION ORAL at 13:04

## 2023-11-17 RX ADMIN — OXYCODONE HYDROCHLORIDE 5 MG: 5 SOLUTION ORAL at 05:24

## 2023-11-17 RX ADMIN — OXYCODONE HYDROCHLORIDE 10 MG: 5 SOLUTION ORAL at 13:04

## 2023-11-17 RX ADMIN — LIDOCAINE 1 PATCH: 4 PATCH TOPICAL at 13:04

## 2023-11-17 RX ADMIN — PANTOPRAZOLE SODIUM 40 MG: 40 INJECTION, POWDER, FOR SOLUTION INTRAVENOUS at 06:12

## 2023-11-17 RX ADMIN — DOCUSATE SODIUM LIQUID 100 MG: 100 LIQUID ORAL at 08:43

## 2023-11-17 RX ADMIN — GABAPENTIN 100 MG: 250 SOLUTION ORAL at 08:43

## 2023-11-17 ASSESSMENT — COGNITIVE AND FUNCTIONAL STATUS - GENERAL
DAILY ACTIVITIY SCORE: 24
MOBILITY SCORE: 24
MOBILITY SCORE: 24
DAILY ACTIVITIY SCORE: 24

## 2023-11-17 ASSESSMENT — PAIN SCALES - GENERAL
PAINLEVEL_OUTOF10: 6
PAINLEVEL_OUTOF10: 3
PAINLEVEL_OUTOF10: 2
PAINLEVEL_OUTOF10: 7

## 2023-11-17 NOTE — DISCHARGE SUMMARY
Discharge Diagnosis  Chronic gastroesophageal reflux disease    Issues Requiring Follow-Up  S/p hiatal hernia repair with sleeve to gastric bypass for acid diversion     Test Results Pending At Discharge  Pending Labs       No current pending labs.            Hospital Course   Patient presented to the hospital for scheduled HHR and required revision of sleeve to gastric bypass for acid diversion. The procedure was carried out without complication. No major events POD#0. POD#1 esophogram was negative for signs of leak or obstruction. Diet advanced to maximum of 4 oz per hour without issue. POD#2 diet was advanced to maximum of 8 oz per hour without issue. No other major events. Discharged home on POD#2 once tolerating 5-8 oz of liquid diet per hour. Patient did well post operatively overall.     Pertinent Physical Exam At Time of Discharge  Physical Exam  Constitutional:       General: She is not in acute distress.     Appearance: Normal appearance.   HENT:      Head: Normocephalic and atraumatic.      Nose: Nose normal.      Mouth/Throat:      Pharynx: Oropharynx is clear.   Cardiovascular:      Rate and Rhythm: Normal rate and regular rhythm.   Pulmonary:      Effort: Pulmonary effort is normal.      Breath sounds: No stridor. No wheezing.   Abdominal:      General: Abdomen is flat. There is no distension.      Palpations: Abdomen is soft.      Tenderness: There is no abdominal tenderness.      Comments: Incision sites c/d/i   Musculoskeletal:         General: No swelling, tenderness or deformity.      Cervical back: Normal range of motion and neck supple.   Skin:     General: Skin is warm and dry.   Neurological:      General: No focal deficit present.      Mental Status: She is alert and oriented to person, place, and time. Mental status is at baseline.   Psychiatric:         Mood and Affect: Mood normal.         Behavior: Behavior normal.         Judgment: Judgment normal.         Home Medications     Medication  List      CONTINUE taking these medications     amphetamine-dextroamphetamine XR 15 mg 24 hr capsule; Commonly known as:   Adderall XR   CENTRUM WOMEN ORAL   famotidine 20 mg tablet; Commonly known as: Pepcid   omeprazole 40 mg DR capsule; Commonly known as: PriLOSEC; Take 1 capsule   (40 mg) by mouth once daily in the morning. Take before meals. Do not   crush or chew. Open capsule, sprinkle beads on SF jello, pudding or   applesauce.   ondansetron ODT 4 mg disintegrating tablet; Commonly known as:   Zofran-ODT; Take 1 tablet (4 mg) by mouth every 6 hours if needed for   nausea or vomiting.     STOP taking these medications     oxyCODONE 5 mg/5 mL solution; Commonly known as: Roxicodone       Outpatient Follow-Up  Future Appointments   Date Time Provider Department Center   11/28/2023  1:00 PM Elvia Townsend RD [id number] [facility]   11/30/2023 10:15 AM Riaz Contreras MD [id number] [place]   12/22/2023 10:45 AM TALAT Valencia [id number] [facility]   12/22/2023  1:30 PM Elvia Townsend RD [id number] [place]   2/12/2024  9:30 AM Elvia Townsend RD [id number] [place]   2/12/2024 10:15 AM TALAT Valencia [id number] [facility]       Delvin Lorenz MD

## 2023-11-17 NOTE — CARE PLAN
Problem: Pain - Adult  Goal: Verbalizes/displays adequate comfort level or baseline comfort level  11/17/2023 1359 by Mamta Sanchez RN  Outcome: Met  11/17/2023 0745 by Mamta Sanchez RN  Outcome: Progressing     Problem: Safety - Adult  Goal: Free from fall injury  11/17/2023 1359 by Mamta Sanchez RN  Outcome: Met  11/17/2023 0745 by Mamta Sanchez RN  Outcome: Progressing     Problem: Discharge Planning  Goal: Discharge to home or other facility with appropriate resources  11/17/2023 1359 by Mamta Sanchez RN  Outcome: Met  11/17/2023 0745 by Mamta Sanchez RN  Outcome: Progressing     Problem: Chronic Conditions and Co-morbidities  Goal: Patient's chronic conditions and co-morbidity symptoms are monitored and maintained or improved  11/17/2023 1359 by Mamta Sanchez RN  Outcome: Met  11/17/2023 0745 by Mamta Sanchez RN  Outcome: Progressing     Problem: Pain  Goal: Takes deep breaths with improved pain control throughout the shift  11/17/2023 1359 by Mamta Sanchez RN  Outcome: Met  11/17/2023 0745 by Mamta Sanchez RN  Outcome: Progressing  Goal: Turns in bed with improved pain control throughout the shift  11/17/2023 1359 by Mamta Sanchez RN  Outcome: Met  11/17/2023 0745 by Mamta Sanchez RN  Outcome: Progressing  Goal: Walks with improved pain control throughout the shift  11/17/2023 1359 by Mamta Sanchez RN  Outcome: Met  11/17/2023 0745 by Mamta Sanchez RN  Outcome: Progressing  Goal: Performs ADL's with improved pain control throughout shift  11/17/2023 1359 by Mamta Sanchez RN  Outcome: Met  11/17/2023 0745 by Mamta Sanchez RN  Outcome: Progressing  Goal: Participates in PT with improved pain control throughout the shift  11/17/2023 1359 by Mamta Sanchez RN  Outcome: Met  11/17/2023 0745 by Mamta Sanchez RN  Outcome: Progressing  Goal: Free from opioid side effects throughout the shift  11/17/2023 1359 by Mamta  SUMI Sanchez  Outcome: Met  11/17/2023 0745 by Mamta Sanchez RN  Outcome: Progressing  Goal: Free from acute confusion related to pain meds throughout the shift  11/17/2023 1359 by Mamta Sanchez RN  Outcome: Met  11/17/2023 0745 by Mamta Sanchez RN  Outcome: Progressing

## 2023-11-17 NOTE — CARE PLAN
The patient's goals for the shift include      The clinical goals for the shift include pain management      Problem: Pain - Adult  Goal: Verbalizes/displays adequate comfort level or baseline comfort level  Outcome: Progressing     Problem: Safety - Adult  Goal: Free from fall injury  Outcome: Progressing     Problem: Pain  Goal: Takes deep breaths with improved pain control throughout the shift  Outcome: Progressing     Problem: Pain  Goal: Turns in bed with improved pain control throughout the shift  Outcome: Progressing     Problem: Pain  Goal: Walks with improved pain control throughout the shift  Outcome: Progressing     Problem: Pain  Goal: Free from opioid side effects throughout the shift  Outcome: Progressing

## 2023-11-20 ENCOUNTER — TELEPHONE (OUTPATIENT)
Dept: SURGERY | Facility: CLINIC | Age: 43
End: 2023-11-20
Payer: COMMERCIAL

## 2023-11-20 PROBLEM — Z98.84 S/P GASTRIC BYPASS: Status: ACTIVE | Noted: 2023-11-20

## 2023-11-20 NOTE — PROGRESS NOTES
BARIATRIC SURGERY CLINIC  FOLLOW UP NOTE      Name: Juana Christianson  MRN: [id number]      Index Surgery  Date of Surgery: 11/15/23   Surgeon: Dr. Riaz Contreras    Surgical Procedure: Revision: Sleeve --> Bypass  80693 and Hiatal hernia repair 77226    HPI: 43 year old female presenting for a 2 week post op visit.       Diet Full Liquids --> Purees     Exercise Ambulation     Concerns related to:   Nausea/Vomiting, Reflux: Denies  Abdominal Pain: denies, reports 4/10 back pain   Diarrhea/Constipation: loose stools, 3-4 BM's yesterday     DAILY SUPPLEMENTS:  Calcium: Calcium Citrate w/ vitamin D (1200 - 1500mg)  Multivitamin & Minerals: 2 per day  Vitamin B12: 500 mcg/day   PPI:Omeprazole       Current Outpatient Medications   Medication Sig Dispense Refill   • famotidine (Pepcid) 20 mg tablet Take 1 tablet (20 mg) by mouth 2 times a day as needed.     • multivitamin/iron/folic acid (CENTRUM WOMEN ORAL) Take 1 capsule by mouth once daily.     • omeprazole (PriLOSEC) 40 mg DR capsule Take 1 capsule (40 mg) by mouth once daily in the morning. Take before meals. Do not crush or chew. Open capsule, sprinkle beads on SF jello, pudding or applesauce. 90 capsule 1   • ondansetron ODT (Zofran-ODT) 4 mg disintegrating tablet Take 1 tablet (4 mg) by mouth every 6 hours if needed for nausea or vomiting. 60 tablet 1   • amphetamine-dextroamphetamine XR (Adderall XR) 15 mg 24 hr capsule Take 1 capsule (15 mg) by mouth once daily in the morning.       No current facility-administered medications for this visit.       Comorbidities:  Patient Active Problem List   Diagnosis   • GERD (gastroesophageal reflux disease)   • Hiatal hernia   • S/P gastric sleeve procedure   • BMI 27.0-27.9,adult   • Tobacco use disorder   • Obesity, Class III, BMI 40-49.9 (morbid obesity) (CMS/HCC)   • Morbid obesity (CMS/HCC)   • Carpal tunnel syndrome   • Chronic gastroesophageal reflux disease   • S/P gastric bypass         REVIEW OF  "SYSTEMS:  CONSTITUTIONAL: Patient denies fevers, chills, sweats and weight changes.  CARDIOVASCULAR: Patient denies chest pains, palpitations, orthopnea and paroxysmal nocturnal dyspnea.  RESPIRATORY: No dyspnea on exertion, no wheezing or cough.  GI: No nausea, vomiting, diarrhea, constipation, abdominal pain, hematochezia or melena.  MUSCULOSKELETAL: No myalgias or arthralgias.  NEUROLOGIC: No chronic headaches, no seizures. Patient denies numbness, tingling or weakness.  PSYCHIATRIC: Patient denies problems with mood disturbance. No problems with anxiety.  ENDOCRINE: No excessive urination or excessive thirst.  DERMATOLOGIC: Patient denies any rashes or skin changes.    PHYSICAL EXAM:  /90 (BP Location: Left arm, Patient Position: Sitting, BP Cuff Size: Adult)   Pulse 94   Temp 36.8 °C (98.2 °F)   Ht 1.575 m (5' 2\")   Wt 58.1 kg (128 lb)   SpO2 97%   BMI 23.41 kg/m²   Alert, well appearing, no acute distress, nourished, hydrated.  Anicteric sclera, no ptosis  Facial symmetry  Neck supple  Unlabored respirations.  Easily conversant without increased respiratory effort  Oriented to person, place, time.  Judgement intact.  Appropriate mood, affect.       ASSESSMENT & PLAN:  43 y.o. female presenting for follow up visit s/p conversion of gastric sleeve to gastric bypass with hiatal hernia repair on 11/15/23.       S/p hiatal hernia repair and conversion to gastric bypass for acid exclusion for severe reflux and tortuosity of the sleeve last week    Patient denies any N,V,F,D,CP or SOB.   Tolerating liquid diet   ambulating and eliminating well  pain under control   incisions healing well   no sign of infection   taking Vitamins and PPI    imp: doing well    recs:     advance diet as advised   advance activity as advised   cont Vitamin supplementation, PPI   FU with dietitian     Follow up 4 weeks at your 6 week post op visit.     Subjective   Patient ID: Juana Christianson is a 43 y.o. female who presents for " Post-op (2 week s/p revision sleeve to gastric bypass with HHR 11/15/2023.).  HPI    Review of Systems    Objective   Physical Exam    Assessment/Plan

## 2023-11-20 NOTE — PATIENT INSTRUCTIONS
You are doing great!   You may shower, let soap and water run over incisions, pat dry.  Things will get easier over time.   Remember to increase your fluids and protein to goals  Do not eat and drink at the same time.   Advance your diet to full liquids/pureed foods according to the diet guidelines.   See the dietician as must as you need too.   Slowly increase your exercise and activity, as you get all your protein you will be able to have more energy.   Take your omeprazole, open the capsule and sprinkle onto applesauce. Do not swallow whole.  Take your Multivitamin, B12 and calcium tablets.   Follow-up in 4 weeks for your 6 week post op visit.

## 2023-11-20 NOTE — TELEPHONE ENCOUNTER
Left a message to check in with the patient post op.  Reminded patient to take her Omeprazole first thing every day.  Continue sipping full liquids for a goal of 64oz per day and 60gm of protein per day.  Ambulate as much as tolerated.  Use IS through this week.  Provided my back line and on call numbers for patient.  She will follow up with Dr. Contreras next week for her post op check in.

## 2023-11-28 ENCOUNTER — NUTRITION (OUTPATIENT)
Dept: SURGERY | Facility: HOSPITAL | Age: 43
End: 2023-11-28
Payer: COMMERCIAL

## 2023-11-28 VITALS — BODY MASS INDEX: 23.22 KG/M2 | WEIGHT: 127 LBS

## 2023-11-28 DIAGNOSIS — Z98.84 S/P GASTRIC BYPASS: Primary | ICD-10-CM

## 2023-11-28 DIAGNOSIS — K21.9 CHRONIC GASTROESOPHAGEAL REFLUX DISEASE: ICD-10-CM

## 2023-11-28 NOTE — PROGRESS NOTES
Surgery Date:  11/15/23  Surgeon:  Ben  Procedure:  conversion of SG to RNY GB, HHR     ASSESSMENT:  Current weight:  127 lbs Ht: 62 in.        BMI: 23.2  Previous weight:   141 lbs  Initial Weight: 141 lbs      PROGRESS:    Nutrition Interventions for last encounter (date):   1. Drink 64 oz of fluid daily  2. Drink enough protein shakes to meet your goal of 60-70 g of protein per day  3. Take 2 chewable multivitamins, 3475-6002 mg of calcium citrate,  and 500 mcg of B12 daily  4. Get up and walk frequently throughout the day.     CHANGES IN TREATMENT:   Patient met goals:  Yes     Actions to meet previous goals:     24 hour food recall: Full liquids, 60-70g protein, 64oz   Beverages: water, crystal light, diet snapple, x2 8oz water bottles, x2 premier protein; strained cream soup, greek yogurt   Alcohol: none      Vitamins:  x2 Flintstones MVI, 1500 mg Calcium, 500 mcg B12  Medications: see list  Physical Activity: walking through out the day   Nausea/Vomiting/Diarrhea/Constipation: some foamies last Friday with 2 bites o of mashed pots, ended up vomiting     READINESS TO LEARN:  Motivation to learn:           Interested     Understanding of instruction: Good   Anticipated Compliance:   Good     Family Support: Unable to assess-family not present   Patient presents with post-op conversion of SG to RNY GB and HHR.  Self reported weight today. Patient has lost 14 pounds since surgery accounting for 11% loss body weight.  Weight loss expected d/t recent surgical procedure causing limited intake of calories/protein/fluid. Patient expressed concerns with significant weight loss, anticipate weight loss to slow down or stop as she begins to introduce more calories with her diet.   Patient tolerating full liquid diet.  Protein intake is not adequate for post-op individual. Fluid consumption is in adequate. Patient is not supplementing recommended vitamin/minerals. Pt states concerns and/or difficulties.   Reviewed the  puree stage transition. Gave some  Reminded pt. to continue to record all PO intake in food journal and to start following 30-30-30 rule.  Encouraged monthly SG meetings.      Malnutrition Screening    Significant unintentional weight loss? N/a  Eating less than 75% of usual intake for more than 2 weeks? n/a      Nutrition Diagnosis:   1. Increased protein and nutrition needs related to altered GI function as evidenced by pt. s/p  gastric bypass/HHR.  2. Food- and nutrition-related knowledge deficit related to lack of prior exposure to surgical weight loss information as evidenced by diet recall.     Nutrition Interventions:   1. Modify type and amount of food and nutrients within meals and snacks.  2. Comprehensive Nutrition Education  -Nutrition education materials: SG Schedule, today's recap, transition to Puree stage         Recommendations:    1. Continue to drink your protein shakes to meet your goal of 60-70 g of protein per day. Begin measuring how much protein you can eat on the soft diet so that you know when to start weaning off of your protein shakes.   2. Continue to drink 64 oz. of zero calorie beverages per day  3. Continue no drinking 30 min before, during the meal and for 30 minutes after the meal  4. Continue to exercise   5. You begin puree stage and follow for 2 weeks. On 12/12/23 begin to transition to the soft stage and follow for 2 weeks.  Refer to pages 12-17 in your Nutrition Guidelines booklet.  6. Remember to eat slowly and chew thoroughly  7. Try one new food at a time to test for any intolerances.   8. Continue to take all of your vitamins and minerals.   9.         Attend our Virtual Support Group Monthly!    Nutrition Monitoring and Evaluation:   1-2 pounds weight loss per week  Criteria: weight check, food recall  Need for Follow-up: 6 weeks post op       Elvia Townsend RD, LD  Clinical Dietitian - Bariatric Surgery  Department of General Surgery  Phone: [phone]

## 2023-11-30 ENCOUNTER — APPOINTMENT (OUTPATIENT)
Dept: SURGERY | Facility: CLINIC | Age: 43
End: 2023-11-30
Payer: COMMERCIAL

## 2023-11-30 ENCOUNTER — OFFICE VISIT (OUTPATIENT)
Dept: SURGERY | Facility: CLINIC | Age: 43
End: 2023-11-30
Payer: COMMERCIAL

## 2023-11-30 VITALS
HEIGHT: 62 IN | WEIGHT: 128 LBS | HEART RATE: 94 BPM | OXYGEN SATURATION: 97 % | SYSTOLIC BLOOD PRESSURE: 126 MMHG | BODY MASS INDEX: 23.55 KG/M2 | DIASTOLIC BLOOD PRESSURE: 90 MMHG | TEMPERATURE: 98.2 F

## 2023-11-30 DIAGNOSIS — R11.2 POST-OPERATIVE NAUSEA AND VOMITING: ICD-10-CM

## 2023-11-30 DIAGNOSIS — K21.9 GASTROESOPHAGEAL REFLUX DISEASE, UNSPECIFIED WHETHER ESOPHAGITIS PRESENT: Primary | ICD-10-CM

## 2023-11-30 DIAGNOSIS — Z09 POSTOPERATIVE EXAMINATION: ICD-10-CM

## 2023-11-30 DIAGNOSIS — Z98.890 POST-OPERATIVE NAUSEA AND VOMITING: ICD-10-CM

## 2023-11-30 DIAGNOSIS — Z98.84 S/P GASTRIC BYPASS: ICD-10-CM

## 2023-11-30 DIAGNOSIS — G89.18 POST-OP PAIN: ICD-10-CM

## 2023-11-30 DIAGNOSIS — K44.9 HIATAL HERNIA: ICD-10-CM

## 2023-11-30 PROCEDURE — 99024 POSTOP FOLLOW-UP VISIT: CPT | Performed by: SURGERY

## 2023-11-30 PROCEDURE — 1036F TOBACCO NON-USER: CPT | Performed by: SURGERY

## 2023-11-30 PROCEDURE — 3008F BODY MASS INDEX DOCD: CPT | Performed by: SURGERY

## 2023-11-30 ASSESSMENT — PAIN SCALES - GENERAL: PAINLEVEL: 4

## 2023-12-07 ENCOUNTER — TELEPHONE (OUTPATIENT)
Dept: SURGERY | Facility: CLINIC | Age: 43
End: 2023-12-07
Payer: COMMERCIAL

## 2023-12-07 DIAGNOSIS — R60.9 SWELLING: ICD-10-CM

## 2023-12-07 DIAGNOSIS — Z98.84 S/P GASTRIC BYPASS: ICD-10-CM

## 2023-12-07 DIAGNOSIS — R13.10 DYSPHAGIA, UNSPECIFIED TYPE: ICD-10-CM

## 2023-12-07 DIAGNOSIS — R11.2 POST-OPERATIVE NAUSEA AND VOMITING: ICD-10-CM

## 2023-12-07 DIAGNOSIS — Z98.890 POST-OPERATIVE NAUSEA AND VOMITING: ICD-10-CM

## 2023-12-07 RX ORDER — PROMETHAZINE HYDROCHLORIDE 12.5 MG/1
12.5 TABLET ORAL EVERY 6 HOURS PRN
Qty: 28 TABLET | Refills: 0 | Status: SHIPPED | OUTPATIENT
Start: 2023-12-07 | End: 2023-12-15

## 2023-12-07 NOTE — TELEPHONE ENCOUNTER
Patient sent Booklrt message endorsing vomiting and foamy output, inability to tolerate diet.  Per Dr. Contreras, patient needs to take it easy as she has been over doing it in the acute post operative phase.  Back down to only liquids the next few days to see if she can tolerate that and add phenergan.  Phenergan sent via pend and send to Dr. Contreras per protocol.  Attempted to reach patient, no answer, left her a voicemail and asked her to please call me directly if she absolutely cannot hold anything down.

## 2023-12-08 ENCOUNTER — HOSPITAL ENCOUNTER (EMERGENCY)
Facility: HOSPITAL | Age: 43
Discharge: HOME | End: 2023-12-08
Attending: EMERGENCY MEDICINE
Payer: COMMERCIAL

## 2023-12-08 ENCOUNTER — APPOINTMENT (OUTPATIENT)
Dept: RADIOLOGY | Facility: HOSPITAL | Age: 43
End: 2023-12-08
Payer: COMMERCIAL

## 2023-12-08 VITALS
SYSTOLIC BLOOD PRESSURE: 111 MMHG | DIASTOLIC BLOOD PRESSURE: 78 MMHG | BODY MASS INDEX: 23 KG/M2 | WEIGHT: 125 LBS | OXYGEN SATURATION: 100 % | HEART RATE: 62 BPM | RESPIRATION RATE: 18 BRPM | TEMPERATURE: 96.8 F | HEIGHT: 62 IN

## 2023-12-08 DIAGNOSIS — R11.2 NAUSEA AND VOMITING, UNSPECIFIED VOMITING TYPE: Primary | ICD-10-CM

## 2023-12-08 LAB
ALBUMIN SERPL BCP-MCNC: 4.6 G/DL (ref 3.4–5)
ALP SERPL-CCNC: 64 U/L (ref 33–110)
ALT SERPL W P-5'-P-CCNC: 13 U/L (ref 7–45)
ANION GAP SERPL CALC-SCNC: 13 MMOL/L (ref 10–20)
APPEARANCE UR: ABNORMAL
APTT PPP: 35 SECONDS (ref 27–38)
AST SERPL W P-5'-P-CCNC: 15 U/L (ref 9–39)
BACTERIA #/AREA URNS AUTO: ABNORMAL /HPF
BASOPHILS # BLD AUTO: 0.03 X10*3/UL (ref 0–0.1)
BASOPHILS NFR BLD AUTO: 0.6 %
BILIRUB SERPL-MCNC: 0.9 MG/DL (ref 0–1.2)
BILIRUB UR STRIP.AUTO-MCNC: NEGATIVE MG/DL
BUN SERPL-MCNC: 17 MG/DL (ref 6–23)
CALCIUM SERPL-MCNC: 9.9 MG/DL (ref 8.6–10.3)
CHLORIDE SERPL-SCNC: 105 MMOL/L (ref 98–107)
CO2 SERPL-SCNC: 28 MMOL/L (ref 21–32)
COLOR UR: YELLOW
CREAT SERPL-MCNC: 0.68 MG/DL (ref 0.5–1.05)
CREAT SERPL-MCNC: 0.71 MG/DL (ref 0.5–1.05)
EOSINOPHIL # BLD AUTO: 0.31 X10*3/UL (ref 0–0.7)
EOSINOPHIL NFR BLD AUTO: 5.9 %
ERYTHROCYTE [DISTWIDTH] IN BLOOD BY AUTOMATED COUNT: 13.2 % (ref 11.5–14.5)
GFR SERPL CREATININE-BSD FRML MDRD: >90 ML/MIN/1.73M*2
GFR SERPL CREATININE-BSD FRML MDRD: >90 ML/MIN/1.73M*2
GLUCOSE SERPL-MCNC: 82 MG/DL (ref 74–99)
GLUCOSE UR STRIP.AUTO-MCNC: NEGATIVE MG/DL
HCG UR QL IA.RAPID: NEGATIVE
HCT VFR BLD AUTO: 39.9 % (ref 36–46)
HGB BLD-MCNC: 13.9 G/DL (ref 12–16)
IMM GRANULOCYTES # BLD AUTO: 0.01 X10*3/UL (ref 0–0.7)
IMM GRANULOCYTES NFR BLD AUTO: 0.2 % (ref 0–0.9)
INR PPP: 1.2 (ref 0.9–1.1)
KETONES UR STRIP.AUTO-MCNC: ABNORMAL MG/DL
LACTATE SERPL-SCNC: 0.7 MMOL/L (ref 0.4–2)
LEUKOCYTE ESTERASE UR QL STRIP.AUTO: NEGATIVE
LIPASE SERPL-CCNC: 31 U/L (ref 9–82)
LYMPHOCYTES # BLD AUTO: 2.17 X10*3/UL (ref 1.2–4.8)
LYMPHOCYTES NFR BLD AUTO: 41.3 %
MAGNESIUM SERPL-MCNC: 2.1 MG/DL (ref 1.6–2.4)
MCH RBC QN AUTO: 31.9 PG (ref 26–34)
MCHC RBC AUTO-ENTMCNC: 34.8 G/DL (ref 32–36)
MCV RBC AUTO: 92 FL (ref 80–100)
MONOCYTES # BLD AUTO: 0.4 X10*3/UL (ref 0.1–1)
MONOCYTES NFR BLD AUTO: 7.6 %
MUCOUS THREADS #/AREA URNS AUTO: ABNORMAL /LPF
NEUTROPHILS # BLD AUTO: 2.33 X10*3/UL (ref 1.2–7.7)
NEUTROPHILS NFR BLD AUTO: 44.4 %
NITRITE UR QL STRIP.AUTO: NEGATIVE
NRBC BLD-RTO: 0 /100 WBCS (ref 0–0)
PH UR STRIP.AUTO: 5 [PH]
PLATELET # BLD AUTO: 303 X10*3/UL (ref 150–450)
POTASSIUM SERPL-SCNC: 3.8 MMOL/L (ref 3.5–5.3)
PROT SERPL-MCNC: 7.1 G/DL (ref 6.4–8.2)
PROT UR STRIP.AUTO-MCNC: ABNORMAL MG/DL
PROTHROMBIN TIME: 13.3 SECONDS (ref 9.8–12.8)
RBC # BLD AUTO: 4.36 X10*6/UL (ref 4–5.2)
RBC # UR STRIP.AUTO: NEGATIVE /UL
RBC #/AREA URNS AUTO: ABNORMAL /HPF
SODIUM SERPL-SCNC: 142 MMOL/L (ref 136–145)
SP GR UR STRIP.AUTO: 1.03
SQUAMOUS #/AREA URNS AUTO: ABNORMAL /HPF
UROBILINOGEN UR STRIP.AUTO-MCNC: 2 MG/DL
WBC # BLD AUTO: 5.3 X10*3/UL (ref 4.4–11.3)
WBC #/AREA URNS AUTO: ABNORMAL /HPF

## 2023-12-08 PROCEDURE — 83735 ASSAY OF MAGNESIUM: CPT | Performed by: PHYSICIAN ASSISTANT

## 2023-12-08 PROCEDURE — 2500000004 HC RX 250 GENERAL PHARMACY W/ HCPCS (ALT 636 FOR OP/ED): Performed by: PHYSICIAN ASSISTANT

## 2023-12-08 PROCEDURE — 81003 URINALYSIS AUTO W/O SCOPE: CPT | Performed by: PHYSICIAN ASSISTANT

## 2023-12-08 PROCEDURE — 96375 TX/PRO/DX INJ NEW DRUG ADDON: CPT

## 2023-12-08 PROCEDURE — 81001 URINALYSIS AUTO W/SCOPE: CPT | Performed by: PHYSICIAN ASSISTANT

## 2023-12-08 PROCEDURE — 81025 URINE PREGNANCY TEST: CPT | Performed by: PHYSICIAN ASSISTANT

## 2023-12-08 PROCEDURE — 85025 COMPLETE CBC W/AUTO DIFF WBC: CPT | Performed by: PHYSICIAN ASSISTANT

## 2023-12-08 PROCEDURE — 36415 COLL VENOUS BLD VENIPUNCTURE: CPT | Performed by: PHYSICIAN ASSISTANT

## 2023-12-08 PROCEDURE — 83605 ASSAY OF LACTIC ACID: CPT | Performed by: PHYSICIAN ASSISTANT

## 2023-12-08 PROCEDURE — 82565 ASSAY OF CREATININE: CPT | Mod: 59 | Performed by: EMERGENCY MEDICINE

## 2023-12-08 PROCEDURE — 99284 EMERGENCY DEPT VISIT MOD MDM: CPT | Performed by: EMERGENCY MEDICINE

## 2023-12-08 PROCEDURE — 2500000004 HC RX 250 GENERAL PHARMACY W/ HCPCS (ALT 636 FOR OP/ED): Performed by: EMERGENCY MEDICINE

## 2023-12-08 PROCEDURE — 80053 COMPREHEN METABOLIC PANEL: CPT | Performed by: PHYSICIAN ASSISTANT

## 2023-12-08 PROCEDURE — 96374 THER/PROPH/DIAG INJ IV PUSH: CPT

## 2023-12-08 PROCEDURE — 71250 CT THORAX DX C-: CPT | Mod: FR

## 2023-12-08 PROCEDURE — 99213 OFFICE O/P EST LOW 20 MIN: CPT | Performed by: STUDENT IN AN ORGANIZED HEALTH CARE EDUCATION/TRAINING PROGRAM

## 2023-12-08 PROCEDURE — 83690 ASSAY OF LIPASE: CPT | Performed by: PHYSICIAN ASSISTANT

## 2023-12-08 PROCEDURE — 85610 PROTHROMBIN TIME: CPT | Performed by: PHYSICIAN ASSISTANT

## 2023-12-08 PROCEDURE — 71250 CT THORAX DX C-: CPT | Mod: FOREIGN READ | Performed by: RADIOLOGY

## 2023-12-08 PROCEDURE — 74176 CT ABD & PELVIS W/O CONTRAST: CPT | Mod: FOREIGN READ | Performed by: RADIOLOGY

## 2023-12-08 RX ORDER — ONDANSETRON HYDROCHLORIDE 2 MG/ML
4 INJECTION, SOLUTION INTRAVENOUS ONCE
Status: COMPLETED | OUTPATIENT
Start: 2023-12-08 | End: 2023-12-08

## 2023-12-08 RX ORDER — THIAMINE HYDROCHLORIDE 100 MG/ML
100 INJECTION, SOLUTION INTRAMUSCULAR; INTRAVENOUS ONCE
Status: COMPLETED | OUTPATIENT
Start: 2023-12-08 | End: 2023-12-08

## 2023-12-08 RX ADMIN — ONDANSETRON 4 MG: 2 INJECTION INTRAMUSCULAR; INTRAVENOUS at 17:54

## 2023-12-08 RX ADMIN — SODIUM CHLORIDE 1000 ML: 9 INJECTION, SOLUTION INTRAVENOUS at 17:54

## 2023-12-08 RX ADMIN — THIAMINE HYDROCHLORIDE 100 MG: 100 INJECTION, SOLUTION INTRAMUSCULAR; INTRAVENOUS at 22:09

## 2023-12-08 RX ADMIN — SODIUM CHLORIDE 1000 ML: 9 INJECTION, SOLUTION INTRAVENOUS at 22:03

## 2023-12-08 ASSESSMENT — LIFESTYLE VARIABLES
EVER FELT BAD OR GUILTY ABOUT YOUR DRINKING: NO
EVER HAD A DRINK FIRST THING IN THE MORNING TO STEADY YOUR NERVES TO GET RID OF A HANGOVER: NO
HAVE YOU EVER FELT YOU SHOULD CUT DOWN ON YOUR DRINKING: NO
HAVE PEOPLE ANNOYED YOU BY CRITICIZING YOUR DRINKING: NO
REASON UNABLE TO ASSESS: NO

## 2023-12-08 ASSESSMENT — PAIN SCALES - GENERAL
PAINLEVEL_OUTOF10: 0 - NO PAIN

## 2023-12-08 ASSESSMENT — COLUMBIA-SUICIDE SEVERITY RATING SCALE - C-SSRS
6. HAVE YOU EVER DONE ANYTHING, STARTED TO DO ANYTHING, OR PREPARED TO DO ANYTHING TO END YOUR LIFE?: NO
2. HAVE YOU ACTUALLY HAD ANY THOUGHTS OF KILLING YOURSELF?: NO
1. IN THE PAST MONTH, HAVE YOU WISHED YOU WERE DEAD OR WISHED YOU COULD GO TO SLEEP AND NOT WAKE UP?: NO

## 2023-12-08 ASSESSMENT — PAIN - FUNCTIONAL ASSESSMENT: PAIN_FUNCTIONAL_ASSESSMENT: 0-10

## 2023-12-08 NOTE — ED TRIAGE NOTES
TRIAGE NOTE   I saw the patient as the Clinician in Triage and performed a brief history and physical exam, established acuity, and ordered appropriate tests to develop basic plan of care. Patient will be seen by an DESIRAE, resident and/or physician who will independently evaluate the patient. Please see subsequent provider notes for further details and disposition.     Brief HPI: In brief, Juana Christianson is a 43 y.o. female that presents for unable to keep anything down.  Patient states she feels like something stuck in her throat.  Patient is on nausea medicine which is not helping.  She cannot even keep fluids down or any thicker foods down.  She states she had a revision with hiatal hernia repair on the 14th.  This was performed by Dr. Blair.  She states she left and she was feeling fine for the last 3 days has been having problems.  She called Dr. Contreras's office who told her to come here.      Focused PE:  - Constitutional: Alert and oriented x3.  In no acute distress, well-nourished and hydrated.  Cooperative.  - Skin: Pink, warm and dry.    -Neurological: Neurologically intact.    - Musculoskeletal: JONN x4, Normal gait. MSP´s intact.    - Cardiac: Regular rate rhythm.  - Pulmonary: Lungs clear bilaterally. No rales, rhonchi or wheezing.  No stridor or accessory muscle use.  - Abdomen: Abdomen soft and nontender with bowel sounds.  No rebound or guarding.  No CVA tenderness.    Note, physical exam may be limited by patient positioning sitting up in a chair.    Plan/MDM: I examined the patient in triage due to high volumes in the ER.  Labs, testing , and initial imaging based upon reported CC and focused exam      - For the remainder of the patient's workup and ED course, please see the main ED provider note. We discussed need for diagnostic testing including laboratory studies and imaging. We also discussed that they may be asked to wait in the waiting room while these tests are pending. They understand that if  they choose to leave without having the testing completed or resulted that we cannot rule out acute life threatening illnesses and the risks involved could lead to worsening condition, permanent disability or even death

## 2023-12-08 NOTE — ED TRIAGE NOTES
Pt in ER from home with c/o feeling like something is stuck in her throat. Per pt her s/sx started 3 days ago and she has not been able to drink or eat.

## 2023-12-08 NOTE — ED PROVIDER NOTES
HPI   Chief Complaint   Patient presents with    Sore Throat       43-year-old female who presents with nausea and vomiting.  Patient had a recent hiatal hernia repair and gastric sleeve revision done on November 15 by Dr. Contreras.  She states since then she has been doing well over the past couple days she has been trying to eat puréed foods and has been nauseated and vomiting she states now even liquids feel like they get stuck in her throat and she brings them back up.  She denies any abdominal pain.  She states she has been passing gas normally.  She denies any fevers chills.                          Dennis Coma Scale Score: 15                  Patient History   Past Medical History:   Diagnosis Date    H/O gastric sleeve     Hiatal hernia      Past Surgical History:   Procedure Laterality Date    CHOLECYSTECTOMY  2009    GASTRIC BYPASS  11/15/23    HYSTERECTOMY  2010    OTHER SURGICAL HISTORY      Sleeve Gastrectomy at [facility] in 2020    OTHER SURGICAL HISTORY      2020 Gastric Sleeve at [facility]     Family History   Problem Relation Name Age of Onset    Stroke Mother Araceli Christianson     Heart disease Father Quentin Wong      Social History     Tobacco Use    Smoking status: Former     Packs/day: 0.50     Years: 5.00     Additional pack years: 0.00     Total pack years: 2.50     Types: Cigarettes     Start date: 1/1/2016     Quit date: 1/1/2020     Years since quitting: 3.9    Smokeless tobacco: Never   Substance Use Topics    Alcohol use: Not Currently     Comment: 1/2 glasses wine yearly    Drug use: Never       Physical Exam   ED Triage Vitals [12/08/23 1624]   Temp Heart Rate Resp BP   36 °C (96.8 °F) 78 16 121/66      SpO2 Temp src Heart Rate Source Patient Position   99 % -- -- --      BP Location FiO2 (%)     -- --       Physical Exam  Constitutional:       Appearance: Normal appearance. She is normal weight.   HENT:      Head: Normocephalic and atraumatic.      Nose: Nose normal.      Mouth/Throat:       Mouth: Mucous membranes are moist.      Pharynx: Oropharynx is clear.   Eyes:      Extraocular Movements: Extraocular movements intact.      Conjunctiva/sclera: Conjunctivae normal.      Pupils: Pupils are equal, round, and reactive to light.   Cardiovascular:      Rate and Rhythm: Normal rate and regular rhythm.   Pulmonary:      Effort: Pulmonary effort is normal.      Breath sounds: Normal breath sounds.   Abdominal:      General: Abdomen is flat. Bowel sounds are normal.      Palpations: Abdomen is soft.   Musculoskeletal:         General: Normal range of motion.      Cervical back: Normal range of motion and neck supple.   Skin:     General: Skin is warm and dry.      Capillary Refill: Capillary refill takes less than 2 seconds.   Neurological:      General: No focal deficit present.      Mental Status: She is alert.   Psychiatric:         Mood and Affect: Mood normal.         Behavior: Behavior normal.         Thought Content: Thought content normal.         Judgment: Judgment normal.         ED Course & MDM   Diagnoses as of 12/08/23 2029   Nausea and vomiting, unspecified vomiting type       Medical Decision Making  Emergency department course, laboratory studies were obtained and reviewed which were unremarkable.  CT of the chest abdomen and pelvis with IV and p.o. contrast was obtained after discussing it with Dr. Contreras.  Patient received IV fluids.  Patient is able to tolerate liquids now.  I will await the final read of the CT chest, abdomen and pelvis prior to disposition this will be turned over to oncoming physician.        Procedure  Procedures     Kaleigh Richardson DO  12/08/23 2049

## 2023-12-09 NOTE — PROGRESS NOTES
Patient care was taken over at sign out from previous physician. Please see initial provider note for details of H&P and ED work up thus far.    In brief, this is a 42 y/o F with a history of gastric bypass status post more recent hiatal hernia repair and gastric sleeve revision with Dr. Contrreas who presented to the ED with vomiting.  Had been evaluated by bariatric surgery fellow who recommended CT abdomen pelvis with IV and p.o. contrast.  Was signed out awaiting imaging results.    Patient reevaluated, appears well. CT results unremarkable and reviewed with GI fellow. Able to tolerate PO fluids. Will be instructed to follow up with bariatric surgery next week for re-evaluation. Given return precautions. Patient expressed understanding and all questions answered. Discharged in stable condition.    Clinical Assessment:  Nausea vomiting    Disposition:  Discharge    Laquita Cline DO  Emergency Medicine

## 2023-12-11 ENCOUNTER — INFUSION (OUTPATIENT)
Dept: INFUSION THERAPY | Facility: CLINIC | Age: 43
End: 2023-12-11
Payer: COMMERCIAL

## 2023-12-11 ENCOUNTER — TELEPHONE (OUTPATIENT)
Dept: SURGERY | Facility: CLINIC | Age: 43
End: 2023-12-11
Payer: COMMERCIAL

## 2023-12-11 VITALS
RESPIRATION RATE: 16 BRPM | HEART RATE: 64 BPM | TEMPERATURE: 97 F | OXYGEN SATURATION: 100 % | SYSTOLIC BLOOD PRESSURE: 120 MMHG | DIASTOLIC BLOOD PRESSURE: 82 MMHG

## 2023-12-11 DIAGNOSIS — R11.2 NAUSEA AND VOMITING, UNSPECIFIED VOMITING TYPE: ICD-10-CM

## 2023-12-11 DIAGNOSIS — E86.0 DEHYDRATION: Primary | ICD-10-CM

## 2023-12-11 DIAGNOSIS — Z98.84 S/P GASTRIC BYPASS: ICD-10-CM

## 2023-12-11 PROBLEM — Z90.3 S/P GASTRIC SLEEVE PROCEDURE: Status: RESOLVED | Noted: 2023-10-31 | Resolved: 2023-12-11

## 2023-12-11 PROBLEM — F17.200 TOBACCO USE DISORDER: Status: RESOLVED | Noted: 2019-09-07 | Resolved: 2023-12-11

## 2023-12-11 PROBLEM — E66.01 MORBID OBESITY (MULTI): Status: RESOLVED | Noted: 2019-09-07 | Resolved: 2023-12-11

## 2023-12-11 PROBLEM — E66.01 OBESITY, CLASS III, BMI 40-49.9 (MORBID OBESITY) (MULTI): Status: RESOLVED | Noted: 2019-05-10 | Resolved: 2023-12-11

## 2023-12-11 PROCEDURE — 96360 HYDRATION IV INFUSION INIT: CPT | Performed by: NURSE PRACTITIONER

## 2023-12-11 RX ORDER — PREDNISONE 20 MG/1
20 TABLET ORAL DAILY
Qty: 3 TABLET | Refills: 0 | Status: SHIPPED | OUTPATIENT
Start: 2023-12-11 | End: 2023-12-14 | Stop reason: ALTCHOICE

## 2023-12-11 RX ORDER — EPINEPHRINE 0.3 MG/.3ML
0.3 INJECTION SUBCUTANEOUS EVERY 5 MIN PRN
Status: CANCELLED | OUTPATIENT
Start: 2023-12-12

## 2023-12-11 RX ORDER — FAMOTIDINE 10 MG/ML
20 INJECTION INTRAVENOUS ONCE AS NEEDED
Status: CANCELLED | OUTPATIENT
Start: 2023-12-11

## 2023-12-11 RX ORDER — SODIUM CHLORIDE, SODIUM LACTATE, POTASSIUM CHLORIDE, CALCIUM CHLORIDE 600; 310; 30; 20 MG/100ML; MG/100ML; MG/100ML; MG/100ML
999 INJECTION, SOLUTION INTRAVENOUS CONTINUOUS
Status: CANCELLED
Start: 2023-12-12

## 2023-12-11 RX ORDER — EPINEPHRINE 0.3 MG/.3ML
0.3 INJECTION SUBCUTANEOUS EVERY 5 MIN PRN
Status: CANCELLED | OUTPATIENT
Start: 2023-12-11

## 2023-12-11 RX ORDER — ONDANSETRON HYDROCHLORIDE 2 MG/ML
4 INJECTION, SOLUTION INTRAVENOUS ONCE
Status: CANCELLED
Start: 2023-12-11 | End: 2023-12-11

## 2023-12-11 RX ORDER — DIPHENHYDRAMINE HYDROCHLORIDE 50 MG/ML
50 INJECTION INTRAMUSCULAR; INTRAVENOUS AS NEEDED
Status: CANCELLED | OUTPATIENT
Start: 2023-12-12

## 2023-12-11 RX ORDER — DIPHENHYDRAMINE HYDROCHLORIDE 50 MG/ML
50 INJECTION INTRAMUSCULAR; INTRAVENOUS AS NEEDED
Status: CANCELLED | OUTPATIENT
Start: 2023-12-11

## 2023-12-11 RX ORDER — ALBUTEROL SULFATE 0.83 MG/ML
3 SOLUTION RESPIRATORY (INHALATION) AS NEEDED
Status: CANCELLED | OUTPATIENT
Start: 2023-12-11

## 2023-12-11 RX ORDER — ALBUTEROL SULFATE 0.83 MG/ML
3 SOLUTION RESPIRATORY (INHALATION) AS NEEDED
Status: CANCELLED | OUTPATIENT
Start: 2023-12-12

## 2023-12-11 RX ORDER — SODIUM CHLORIDE, SODIUM LACTATE, POTASSIUM CHLORIDE, CALCIUM CHLORIDE 600; 310; 30; 20 MG/100ML; MG/100ML; MG/100ML; MG/100ML
999 INJECTION, SOLUTION INTRAVENOUS CONTINUOUS
Status: DISCONTINUED | OUTPATIENT
Start: 2023-12-11 | End: 2023-12-11 | Stop reason: HOSPADM

## 2023-12-11 RX ORDER — FAMOTIDINE 10 MG/ML
20 INJECTION INTRAVENOUS ONCE AS NEEDED
Status: CANCELLED | OUTPATIENT
Start: 2023-12-12

## 2023-12-11 RX ORDER — ONDANSETRON HYDROCHLORIDE 2 MG/ML
4 INJECTION, SOLUTION INTRAVENOUS ONCE
Status: CANCELLED
Start: 2023-12-12 | End: 2023-12-12

## 2023-12-11 RX ADMIN — SODIUM CHLORIDE, SODIUM LACTATE, POTASSIUM CHLORIDE, CALCIUM CHLORIDE 999 ML/HR: 600; 310; 30; 20 INJECTION, SOLUTION INTRAVENOUS at 13:37

## 2023-12-11 ASSESSMENT — ENCOUNTER SYMPTOMS
CONSTIPATION: 0
ABDOMINAL PAIN: 0
ALLERGIC/IMMUNOLOGIC NEGATIVE: 1
NAUSEA: 0
ABDOMINAL DISTENTION: 0
PSYCHIATRIC NEGATIVE: 1
GASTROINTESTINAL NEGATIVE: 1
EYES NEGATIVE: 1
CARDIOVASCULAR NEGATIVE: 1
HEMATOLOGIC/LYMPHATIC NEGATIVE: 1
NEUROLOGICAL NEGATIVE: 1
MUSCULOSKELETAL NEGATIVE: 1
TROUBLE SWALLOWING: 1
RESPIRATORY NEGATIVE: 1
FATIGUE: 1
ENDOCRINE NEGATIVE: 1
VOMITING: 0
DIARRHEA: 0

## 2023-12-11 NOTE — PATIENT INSTRUCTIONS
Today you received: 1 LITER OF LACTATED RINGERS INFUSION  For: DEHYDRATION    (Tell all doctors including dentists that you are taking this medication)    Go the emergency room or call 911 if:  - You have signs of allergic reaction:   o      Rash, hives, itching.   o      Swollen, blistered, peeling skin.   o      Swelling of face, lips, mouth, tongue or throat.   o      Tightness of chest, trouble breathing, swallowing or talking     Call your doctor:    - If IV / injection site gets red, warm, swollen, itchy or leaks fluid or pus.     (Leave dressing on your IV site for at least 2 hours and keep area clean and dry)    - If you get sick or have symptoms of infection or are not feeling well for any reason.    (Wash your hands often, stay away from people who are sick)    - If you have side effects from your medication that do not go away or are bothersome.     (Refer to the teaching your nurse gave you for side effects to call your doctor about)     Common side effects may include:  stuffy nose, headache, feeling tired, muscle aches, upset stomach    - Before receiving any vaccines.    Call the [facility] at [phone] if:    - You get sick, have surgery or dental work and your doctor wants your visit rescheduled.    - You need to cancel and reschedule your visit for any reason. Call at least 2 days before your visit if you need to cancel.     - Your insurance changes before your next visit.    (We will need to get approval from your new insurance. This can take up to two weeks.)    The [facility] is opened Monday thru Friday 8am-8pm, Saturday 9am-5pm. We are closed on major holidays.     Voice mail will take your call if the center is closed. If you leave a message please allow 24 hours for a call back during weekdays. If you leave a message on a weekend/holiday, we will call you back the next business day.

## 2023-12-11 NOTE — CONSULTS
"Reason For Consult  Dysphagia    History Of Present Illness  Juana Christianson is a 43 y.o. female s/p recent conversion sleeve to RYGB and HH repair done on 11/15/23 by Dr. Contreras. She was seen postop in clinic and was doing well overall. She was advanced to puree and was able to tolerate that for a few days. On Tuesday, she had cottage cheese but felt like it got stuck in her throat at the neck level. It then made her feel \"sick\". Since then, she started to tolerate less of PO. She would drink fluids and most of it would go down but some of it regurges back up. She also thinks the anxiety of choking might be making it feel worse. She denied abdominal pain. No complaints about her incisions. Passing gas and Bms. No fever/chills. Vitals were WNL. Labs were also WNL. She had one liter of fluid when I was evaluating her and she had felt better already.     Past Medical History  She has a past medical history of BMI 27.0-27.9,adult (10/31/2023), H/O gastric sleeve, Hiatal hernia, Morbid obesity (CMS/HCC) (09/07/2019), S/P gastric sleeve procedure (10/31/2023), and Tobacco use disorder (09/07/2019).    Surgical History  She has a past surgical history that includes Other surgical history; Cholecystectomy (2009); Gastric bypass (11/15/23); Hysterectomy (2010); and Other surgical history.     Social History  She reports that she quit smoking about 3 years ago. Her smoking use included cigarettes. She started smoking about 7 years ago. She has a 2.50 pack-year smoking history. She has never used smokeless tobacco. She reports that she does not currently use alcohol. She reports that she does not use drugs.    Family History  Family History   Problem Relation Name Age of Onset    Stroke Mother Araceli Christianson     Heart disease Father Quentin Wong         Allergies  Patient has no known allergies.    Review of Systems  Review of Systems   Constitutional:  Positive for fatigue.   HENT:  Positive for trouble swallowing.    Eyes: " "Negative.    Respiratory: Negative.     Cardiovascular: Negative.    Gastrointestinal: Negative.  Negative for abdominal distention, abdominal pain, constipation, diarrhea, nausea and vomiting.   Endocrine: Negative.    Genitourinary: Negative.    Musculoskeletal: Negative.    Skin: Negative.    Allergic/Immunologic: Negative.    Neurological: Negative.    Hematological: Negative.    Psychiatric/Behavioral: Negative.           Physical Exam  Physical Exam  Vitals reviewed.   Constitutional:       Appearance: Normal appearance. She is obese.   HENT:      Head: Normocephalic and atraumatic.      Nose: Nose normal.   Eyes:      Extraocular Movements: Extraocular movements intact.      Pupils: Pupils are equal, round, and reactive to light.   Cardiovascular:      Rate and Rhythm: Normal rate and regular rhythm.   Pulmonary:      Effort: Pulmonary effort is normal.   Abdominal:      General: Abdomen is flat. There is no distension.      Palpations: Abdomen is soft. There is no mass.      Tenderness: There is no abdominal tenderness.   Musculoskeletal:         General: Normal range of motion.      Cervical back: Normal range of motion and neck supple.   Skin:     General: Skin is warm and dry.      Capillary Refill: Capillary refill takes less than 2 seconds.   Neurological:      General: No focal deficit present.      Mental Status: She is alert and oriented to person, place, and time.   Psychiatric:         Mood and Affect: Mood normal.         Behavior: Behavior normal.         Thought Content: Thought content normal.         Judgment: Judgment normal.            Last Recorded Vitals  Blood pressure 111/78, pulse 62, temperature 36 °C (96.8 °F), resp. rate 18, height 1.575 m (5' 2\"), weight 56.7 kg (125 lb), SpO2 100 %.         Assessment/Plan     43F s/p recent conversion sleeve to RYGB and HH repair done on 11/15/23 by Dr. Contreras.  CT reviewed.     She felt better after PO trial.   Advised to stay away from eating " what tastes different or make her feel nauseous  2L LR given  Follow up in clinic within the next week    Radha Myers MD

## 2023-12-11 NOTE — H&P (VIEW-ONLY)
BARIATRIC SURGERY CLINIC  FOLLOW UP NOTE      Name: Juana Christianson  MRN: [id number]      Index Surgery  Date of Surgery: 11/15/2023   Surgeon: Dr. Riaz Contreras    Surgical Procedure: Revision: Sleeve to Bypass 66209 and Hiatal hernia repair 21022    HPI: 43 year old woman presenting today for a 6 week (early visit due to swallowing issues) post op visit and follow up to recent ED admission.  Patient was progressing well post operatively, developed difficulty swallowing and intolerance to diet.  Patient experiencing foamy vomit even with small volumes of liquids.  Patient was started on 20mg of prednisone x 3 days for swelling at the GE junction     CT A/P 12/8/23:   IMPRESSION:  1. Prior gastric bypass procedure. Thickening of the wall of the  greater curvature of the stomach is present, presumably related to  prior surgery. Close attention on follow-up recommended.  2. Small sliding-type hiatal hernia is present.  There is appearance  of a soft tissue mass lesion of the gastroesophageal junction which is  suspected related to intrathoracic stomach.  This can be further  assessed with endoscopy as clinically warranted.      Diet Puree (not tolerating)     Exercise Ambulating - back to work     Concerns related to:  Nausea/Vomiting, Reflux: Yes  Abdominal Pain: No  Diarrhea/Constipation No    DAILY SUPPLEMENTS:  Calcium: Calcium Citrate w/ vitamin D (1200 - 1500mg)  Multivitamin & Minerals: 2 per day  Vitamin B12: 500 mcg/day   PPI:BID Omeprazole started 12/11/23      Current Outpatient Medications   Medication Sig Dispense Refill    amphetamine-dextroamphetamine XR (Adderall XR) 15 mg 24 hr capsule Take 1 capsule (15 mg) by mouth once daily in the morning.      famotidine (Pepcid) 20 mg tablet Take 1 tablet (20 mg) by mouth 2 times a day as needed.      multivitamin/iron/folic acid (CENTRUM WOMEN ORAL) Take 1 capsule by mouth once daily.      omeprazole (PriLOSEC) 40 mg DR capsule Take 1 capsule (40 mg) by mouth  once daily in the morning. Take before meals. Do not crush or chew. Open capsule, sprinkle beads on SF jello, pudding or applesauce. 90 capsule 1    ondansetron ODT (Zofran-ODT) 4 mg disintegrating tablet Take 1 tablet (4 mg) by mouth every 6 hours if needed for nausea or vomiting. 60 tablet 1    predniSONE (Deltasone) 20 mg tablet Take 1 tablet (20 mg) by mouth once daily for 3 days. 3 tablet 0    promethazine (Phenergan) 12.5 mg tablet Take 1 tablet (12.5 mg) by mouth every 6 hours if needed for nausea or vomiting for up to 7 days. 28 tablet 0     No current facility-administered medications for this visit.       Comorbidities:  Patient Active Problem List   Diagnosis    GERD (gastroesophageal reflux disease)    Hiatal hernia    Carpal tunnel syndrome    Chronic gastroesophageal reflux disease    S/P gastric bypass    Nausea and vomiting         REVIEW OF SYSTEMS:  CONSTITUTIONAL: Patient denies fevers, chills, sweats and weight changes.  CARDIOVASCULAR: Patient denies chest pains, palpitations, orthopnea and paroxysmal nocturnal dyspnea.  RESPIRATORY: No dyspnea on exertion, no wheezing or cough.  GI: No nausea, vomiting, diarrhea, constipation, abdominal pain, hematochezia or melena.  MUSCULOSKELETAL: No myalgias or arthralgias.  NEUROLOGIC: No chronic headaches, no seizures. Patient denies numbness, tingling or weakness.  PSYCHIATRIC: Patient denies problems with mood disturbance. No problems with anxiety.  ENDOCRINE: No excessive urination or excessive thirst.  DERMATOLOGIC: Patient denies any rashes or skin changes.    PHYSICAL EXAM:  There were no vitals taken for this visit.  Alert, well appearing, no acute distress, nourished, hydrated.  Anicteric sclera, no ptosis  Facial symmetry  Neck supple  Unlabored respirations.  Easily conversant without increased respiratory effort  Oriented to person, place, time.  Judgement intact.  Appropriate mood, affect.       ASSESSMENT & PLAN:  43 y.o. female presenting  for follow up visit s/p Sleeve to Bypass conversion with HHR 11/15/2023. Patient was progressing well, now having issues tolerating diet and feels that things are getting stuck when swallowing.  Patient having episodes of emesis.  Seen in ED 12/8/23.     Patient is still not meeting fluid goals and not able to advance her diet.  She tried fair life protein shake yesterday and barely was able to take few ounces overall less than 30 ounces per day fluid intake.  She has required different sessions of outpatient IV hydration.    She has taken her steroid doses as well without significant improvement.    CT scan was consistent with questionable soft tissue lesion at the GE junction/gastric pouch area which during the surgery we did not notice anything, may represent some hematoma etc.    Will plan for an EGD with possible need for dilation next week, will arrange for IV hydration as an outpatient through infusion clinic a well.

## 2023-12-11 NOTE — PROGRESS NOTES
BARIATRIC SURGERY CLINIC  FOLLOW UP NOTE      Name: Juana Christianson  MRN: [id number]      Index Surgery  Date of Surgery: 11/15/2023   Surgeon: Dr. Riaz Contreras    Surgical Procedure: Revision: Sleeve to Bypass 30891 and Hiatal hernia repair 22782    HPI: 43 year old woman presenting today for a 6 week (early visit due to swallowing issues) post op visit and follow up to recent ED admission.  Patient was progressing well post operatively, developed difficulty swallowing and intolerance to diet.  Patient experiencing foamy vomit even with small volumes of liquids.  Patient was started on 20mg of prednisone x 3 days for swelling at the GE junction     CT A/P 12/8/23:   IMPRESSION:  1. Prior gastric bypass procedure. Thickening of the wall of the  greater curvature of the stomach is present, presumably related to  prior surgery. Close attention on follow-up recommended.  2. Small sliding-type hiatal hernia is present.  There is appearance  of a soft tissue mass lesion of the gastroesophageal junction which is  suspected related to intrathoracic stomach.  This can be further  assessed with endoscopy as clinically warranted.      Diet Puree (not tolerating)     Exercise Ambulating - back to work     Concerns related to:  Nausea/Vomiting, Reflux: Yes  Abdominal Pain: No  Diarrhea/Constipation No    DAILY SUPPLEMENTS:  Calcium: Calcium Citrate w/ vitamin D (1200 - 1500mg)  Multivitamin & Minerals: 2 per day  Vitamin B12: 500 mcg/day   PPI:BID Omeprazole started 12/11/23      Current Outpatient Medications   Medication Sig Dispense Refill    amphetamine-dextroamphetamine XR (Adderall XR) 15 mg 24 hr capsule Take 1 capsule (15 mg) by mouth once daily in the morning.      famotidine (Pepcid) 20 mg tablet Take 1 tablet (20 mg) by mouth 2 times a day as needed.      multivitamin/iron/folic acid (CENTRUM WOMEN ORAL) Take 1 capsule by mouth once daily.      omeprazole (PriLOSEC) 40 mg DR capsule Take 1 capsule (40 mg) by mouth  once daily in the morning. Take before meals. Do not crush or chew. Open capsule, sprinkle beads on SF jello, pudding or applesauce. 90 capsule 1    ondansetron ODT (Zofran-ODT) 4 mg disintegrating tablet Take 1 tablet (4 mg) by mouth every 6 hours if needed for nausea or vomiting. 60 tablet 1    predniSONE (Deltasone) 20 mg tablet Take 1 tablet (20 mg) by mouth once daily for 3 days. 3 tablet 0    promethazine (Phenergan) 12.5 mg tablet Take 1 tablet (12.5 mg) by mouth every 6 hours if needed for nausea or vomiting for up to 7 days. 28 tablet 0     No current facility-administered medications for this visit.       Comorbidities:  Patient Active Problem List   Diagnosis    GERD (gastroesophageal reflux disease)    Hiatal hernia    Carpal tunnel syndrome    Chronic gastroesophageal reflux disease    S/P gastric bypass    Nausea and vomiting         REVIEW OF SYSTEMS:  CONSTITUTIONAL: Patient denies fevers, chills, sweats and weight changes.  CARDIOVASCULAR: Patient denies chest pains, palpitations, orthopnea and paroxysmal nocturnal dyspnea.  RESPIRATORY: No dyspnea on exertion, no wheezing or cough.  GI: No nausea, vomiting, diarrhea, constipation, abdominal pain, hematochezia or melena.  MUSCULOSKELETAL: No myalgias or arthralgias.  NEUROLOGIC: No chronic headaches, no seizures. Patient denies numbness, tingling or weakness.  PSYCHIATRIC: Patient denies problems with mood disturbance. No problems with anxiety.  ENDOCRINE: No excessive urination or excessive thirst.  DERMATOLOGIC: Patient denies any rashes or skin changes.    PHYSICAL EXAM:  There were no vitals taken for this visit.  Alert, well appearing, no acute distress, nourished, hydrated.  Anicteric sclera, no ptosis  Facial symmetry  Neck supple  Unlabored respirations.  Easily conversant without increased respiratory effort  Oriented to person, place, time.  Judgement intact.  Appropriate mood, affect.       ASSESSMENT & PLAN:  43 y.o. female presenting  for follow up visit s/p Sleeve to Bypass conversion with HHR 11/15/2023. Patient was progressing well, now having issues tolerating diet and feels that things are getting stuck when swallowing.  Patient having episodes of emesis.  Seen in ED 12/8/23.     Patient is still not meeting fluid goals and not able to advance her diet.  She tried fair life protein shake yesterday and barely was able to take few ounces overall less than 30 ounces per day fluid intake.  She has required different sessions of outpatient IV hydration.    She has taken her steroid doses as well without significant improvement.    CT scan was consistent with questionable soft tissue lesion at the GE junction/gastric pouch area which during the surgery we did not notice anything, may represent some hematoma etc.    Will plan for an EGD with possible need for dilation next week, will arrange for IV hydration as an outpatient through infusion clinic a well.

## 2023-12-11 NOTE — TELEPHONE ENCOUNTER
Patient sent a [employer] message on Sunday stating she is having the same issues she was having in the ER this weekend.  I spoke with our Fellow who advised to set up for IV fluids and have the patient follow up with Dr. Contreras on Thursday.  I called and spoke with the patient who said she just had a few sips of vitamin water and threw it all back up.  I advised the patient of the plan that we can get her up for 2L of LR twice this week at the [place] location but that would still be over an hour drive for the patient.  Suggested if her PCP could potentially set up the fluids at [facility] that may be better for the patient location-wise.  Advised the patient to talk with PCP to see if this is something they could do for her and let me  know the outcome.  Advised patient to take teeny tiny sips only 1-2 ounces at a time and then wait awhile between next sips.  Advised of new appointment time on Thursday with Dr. Contreras (virtually).  Asked the patient to keep me in the loop on how things are going.

## 2023-12-11 NOTE — PATIENT INSTRUCTIONS
You saw Dr. Contreras today for your 6 week post op visit and follow up to ED visit   Continue to sip slowly on liquids as you tolerate.    Rotate your nausea medications if you are continuing to feel nauseated  Slowly advance your diet as instructed  Take your Omeprazole daily as instructed   Take your vitamins as instructed   Call the office right away with any issues [phone]

## 2023-12-12 ENCOUNTER — INFUSION (OUTPATIENT)
Dept: INFUSION THERAPY | Facility: CLINIC | Age: 43
End: 2023-12-12
Payer: COMMERCIAL

## 2023-12-12 VITALS
SYSTOLIC BLOOD PRESSURE: 105 MMHG | TEMPERATURE: 97.2 F | RESPIRATION RATE: 16 BRPM | OXYGEN SATURATION: 100 % | DIASTOLIC BLOOD PRESSURE: 70 MMHG | HEART RATE: 67 BPM

## 2023-12-12 DIAGNOSIS — Z98.84 S/P GASTRIC BYPASS: ICD-10-CM

## 2023-12-12 DIAGNOSIS — E86.0 DEHYDRATION: ICD-10-CM

## 2023-12-12 DIAGNOSIS — R11.2 NAUSEA AND VOMITING, UNSPECIFIED VOMITING TYPE: ICD-10-CM

## 2023-12-12 PROCEDURE — 96374 THER/PROPH/DIAG INJ IV PUSH: CPT | Performed by: NURSE PRACTITIONER

## 2023-12-12 PROCEDURE — 96361 HYDRATE IV INFUSION ADD-ON: CPT | Performed by: NURSE PRACTITIONER

## 2023-12-12 RX ORDER — FAMOTIDINE 10 MG/ML
20 INJECTION INTRAVENOUS ONCE AS NEEDED
Status: CANCELLED | OUTPATIENT
Start: 2023-12-13

## 2023-12-12 RX ORDER — EPINEPHRINE 0.3 MG/.3ML
0.3 INJECTION SUBCUTANEOUS EVERY 5 MIN PRN
Status: CANCELLED | OUTPATIENT
Start: 2023-12-13

## 2023-12-12 RX ORDER — DIPHENHYDRAMINE HYDROCHLORIDE 50 MG/ML
50 INJECTION INTRAMUSCULAR; INTRAVENOUS AS NEEDED
Status: CANCELLED | OUTPATIENT
Start: 2023-12-13

## 2023-12-12 RX ORDER — SODIUM CHLORIDE, SODIUM LACTATE, POTASSIUM CHLORIDE, CALCIUM CHLORIDE 600; 310; 30; 20 MG/100ML; MG/100ML; MG/100ML; MG/100ML
999 INJECTION, SOLUTION INTRAVENOUS CONTINUOUS
Status: CANCELLED
Start: 2023-12-13

## 2023-12-12 RX ORDER — SODIUM CHLORIDE, SODIUM LACTATE, POTASSIUM CHLORIDE, CALCIUM CHLORIDE 600; 310; 30; 20 MG/100ML; MG/100ML; MG/100ML; MG/100ML
999 INJECTION, SOLUTION INTRAVENOUS CONTINUOUS
Status: DISCONTINUED | OUTPATIENT
Start: 2023-12-12 | End: 2023-12-12 | Stop reason: HOSPADM

## 2023-12-12 RX ORDER — ONDANSETRON HYDROCHLORIDE 2 MG/ML
4 INJECTION, SOLUTION INTRAVENOUS ONCE
Status: COMPLETED | OUTPATIENT
Start: 2023-12-12 | End: 2023-12-12

## 2023-12-12 RX ORDER — ALBUTEROL SULFATE 0.83 MG/ML
3 SOLUTION RESPIRATORY (INHALATION) AS NEEDED
Status: CANCELLED | OUTPATIENT
Start: 2023-12-13

## 2023-12-12 RX ORDER — ONDANSETRON HYDROCHLORIDE 2 MG/ML
4 INJECTION, SOLUTION INTRAVENOUS ONCE
Status: CANCELLED
Start: 2023-12-13 | End: 2023-12-13

## 2023-12-12 RX ADMIN — ONDANSETRON HYDROCHLORIDE 4 MG: 2 INJECTION, SOLUTION INTRAVENOUS at 14:28

## 2023-12-12 RX ADMIN — SODIUM CHLORIDE, SODIUM LACTATE, POTASSIUM CHLORIDE, CALCIUM CHLORIDE 999 ML/HR: 600; 310; 30; 20 INJECTION, SOLUTION INTRAVENOUS at 13:15

## 2023-12-12 ASSESSMENT — ENCOUNTER SYMPTOMS
CARDIOVASCULAR NEGATIVE: 1
NAUSEA: 1
MUSCULOSKELETAL NEGATIVE: 1
ENDOCRINE NEGATIVE: 1
EYES NEGATIVE: 1
HEMATOLOGIC/LYMPHATIC NEGATIVE: 1
RESPIRATORY NEGATIVE: 1
VOMITING: 1
CONSTITUTIONAL NEGATIVE: 1
PSYCHIATRIC NEGATIVE: 1
NEUROLOGICAL NEGATIVE: 1

## 2023-12-12 NOTE — PROGRESS NOTES
[facility]   infusion Clinic Note   Date: 2023   Name: Juana Christianson  : 1980   MRN: [id number]         Reason for Visit: OP Infusion (PATIENT HERE FOR LACTATED RINGERS 2 LITERS)         Visit Type: INFUSION      Ordered By: DR HULL      Accompanied by:Self      Diagnosis: Dehydration    Nausea and vomiting, unspecified vomiting type    S/P gastric bypass       Allergies:   Allergies as of 2023    (No Known Allergies)         Current Medications has a current medication list which includes the following prescription(s): amphetamine-dextroamphetamine xr, famotidine, multivitamin/iron/folic acid, omeprazole, ondansetron odt, prednisone, and promethazine, and the following Facility-Administered Medications: lactated ringer's.       Vitals:  Vitals:    23 1307   BP: 105/70   Pulse: 67   Resp: 16   Temp: 36.2 °C (97.2 °F)   SpO2: 100%             Infusion Pre-procedure Checklist:   - Allergies reviewed: yes   - Medications reviewed: yes       - Previous reaction to current treatment: no      Assess patient for the concerns below. Document provider notification as appropriate.  - Active or recent infection with/without current antibiotic use: no  - Recent or planned invasive dental work: no  - Recent or planned surgeries: yes  - Recently received or plans to receive vaccinations: no  - Has treatment related toxicities: no  - Is pregnant:  no      Pain: 0   - Is the pain different from normal: N/A   - Is the pain tolerable: N/A   - Is your Doctor aware:  n/a      Labs: N/A         Fall Risk Screening: [place] Fall Risk  History of Falling, Immediate or Within 3 Months: No  Secondary Diagnosis: No  Ambulatory Aid: Walks without aid/bedrest/nurse assist  Intravenous Therapy/Heparin Lock: No  Gait/Transferring: Normal/bedrest/immobile  Mental Status: Oriented to own ability  [place] Fall Risk Score: 0       Review Of Systems:  Review of Systems   Constitutional: Negative.     HENT:  Negative.     Eyes: Negative.    Respiratory: Negative.     Cardiovascular: Negative.    Gastrointestinal:  Positive for nausea and vomiting.   Endocrine: Negative.    Genitourinary: Negative.     Musculoskeletal: Negative.    Skin: Negative.    Neurological: Negative.    Hematological: Negative.    Psychiatric/Behavioral: Negative.           Infusion Readiness:   - Assessment Concerns Related to Infusion: No  - Provider notified: n/a      Document Below Only If Indicated:   New Patient Education:    N/A (returning patient for continuation of therapy. Ongoing education provided as needed.)        Treatment Conditions & Drug Specific Questions:    NOT APPLICABLE        Weight Based Drug Calculations:    WEIGHT BASED DRUGS: NOT APPLICABLE / FLAT DOSE          Initiated By: Ameena Carney RN   Time: 3:58 PM     We administered lactated Ringer's and ondansetron.       Note authored and patient cared for by: Ameena Carney RN    Note/Encounter reviewed by: AMANDA Rome, FNP-C. This provider on site at time of patient infusion. Infusion staff to notify this provider of any questions, concerns, abnormals or issues during infusion. No issues reported. Pt. tolerated infusion without difficulty. Pt. not independently evaluated by this provider during today's encounter.

## 2023-12-12 NOTE — PATIENT INSTRUCTIONS
Today you received:  LACTATED RINGERS 2 LITERS 2/3    For:    1. Dehydration    2. Nausea and vomiting, unspecified vomiting type    3. S/P gastric bypass            Please read the  Medication Guide that was given to you and reviewed during todays visit.     (Tell all doctors including dentists that you are taking this medication)     Go to the emergency room or call 911 if:  -You have signs of allergic reaction:   o         Rash, hives, itching.   o         Swollen, blistered, peeling skin.   o         Swelling of face, lips, mouth, tongue or throat.   o         Tightness of chest, trouble breathing, swallowing or talking      Call your doctor:     - If IV / injection site gets red, warm, swollen, itchy or leaks fluid or pus.     (Leave dressing on your IV site for at least 2 hours and keep area clean and dry    - If you get sick or have symptoms of infection or are not feeling well for any reason.    (Wash your hands often, stay away from people who are sick)    - If you have side effects from your medication that do not go away or are bothersome.     (Refer to the teaching your nurse gave you for side effects to call your doctor about)     Common side effects may include:  stuffy nose, headache, feeling tired, muscle aches, upset stomach    - Before receiving any vaccines, Call the [facility] at [phone]     - You get sick, are on antibiotics, have had a recent vaccine, have surgery or dental work and your doctor wants your visit rescheduled.    - You need to cancel and reschedule your visit for any reason. Call at least 2 days before your visit if you need to cancel.     - Your insurance changes before your next visit.    (We will need to get approval from your new insurance. This can take up to two weeks.)     The [facility] is opened Monday thru Friday 8am-8pm and Saturday 8am-4:30pm. We are closed on holidays.     Voice mail will take your call if the center is closed.  If you leave a message please allow 24 hours for a call back during weekdays. If you leave a message on a weekend/holiday, we will call you back the next business day.

## 2023-12-13 ENCOUNTER — INFUSION (OUTPATIENT)
Dept: INFUSION THERAPY | Facility: CLINIC | Age: 43
End: 2023-12-13
Payer: COMMERCIAL

## 2023-12-13 VITALS
SYSTOLIC BLOOD PRESSURE: 122 MMHG | OXYGEN SATURATION: 99 % | RESPIRATION RATE: 16 BRPM | TEMPERATURE: 97.9 F | HEART RATE: 74 BPM | DIASTOLIC BLOOD PRESSURE: 79 MMHG

## 2023-12-13 DIAGNOSIS — R11.2 NAUSEA AND VOMITING, UNSPECIFIED VOMITING TYPE: ICD-10-CM

## 2023-12-13 DIAGNOSIS — Z98.84 S/P GASTRIC BYPASS: ICD-10-CM

## 2023-12-13 DIAGNOSIS — E86.0 DEHYDRATION: Primary | ICD-10-CM

## 2023-12-13 PROCEDURE — 96360 HYDRATION IV INFUSION INIT: CPT | Performed by: REGISTERED NURSE

## 2023-12-13 PROCEDURE — 96361 HYDRATE IV INFUSION ADD-ON: CPT | Performed by: REGISTERED NURSE

## 2023-12-13 RX ORDER — DIPHENHYDRAMINE HYDROCHLORIDE 50 MG/ML
50 INJECTION INTRAMUSCULAR; INTRAVENOUS AS NEEDED
OUTPATIENT
Start: 2023-12-13

## 2023-12-13 RX ORDER — ONDANSETRON HYDROCHLORIDE 2 MG/ML
4 INJECTION, SOLUTION INTRAVENOUS ONCE
Status: DISCONTINUED | OUTPATIENT
Start: 2023-12-13 | End: 2023-12-13 | Stop reason: HOSPADM

## 2023-12-13 RX ORDER — SODIUM CHLORIDE, SODIUM LACTATE, POTASSIUM CHLORIDE, CALCIUM CHLORIDE 600; 310; 30; 20 MG/100ML; MG/100ML; MG/100ML; MG/100ML
999 INJECTION, SOLUTION INTRAVENOUS CONTINUOUS
Status: CANCELLED
Start: 2023-12-13

## 2023-12-13 RX ORDER — FAMOTIDINE 10 MG/ML
20 INJECTION INTRAVENOUS ONCE AS NEEDED
OUTPATIENT
Start: 2023-12-13

## 2023-12-13 RX ORDER — ONDANSETRON HYDROCHLORIDE 2 MG/ML
4 INJECTION, SOLUTION INTRAVENOUS ONCE
Start: 2023-12-13 | End: 2023-12-13

## 2023-12-13 RX ORDER — ALBUTEROL SULFATE 0.83 MG/ML
3 SOLUTION RESPIRATORY (INHALATION) AS NEEDED
OUTPATIENT
Start: 2023-12-13

## 2023-12-13 RX ORDER — SODIUM CHLORIDE, SODIUM LACTATE, POTASSIUM CHLORIDE, CALCIUM CHLORIDE 600; 310; 30; 20 MG/100ML; MG/100ML; MG/100ML; MG/100ML
999 INJECTION, SOLUTION INTRAVENOUS CONTINUOUS
Status: DISCONTINUED | OUTPATIENT
Start: 2023-12-13 | End: 2023-12-13 | Stop reason: HOSPADM

## 2023-12-13 RX ORDER — EPINEPHRINE 0.3 MG/.3ML
0.3 INJECTION SUBCUTANEOUS EVERY 5 MIN PRN
OUTPATIENT
Start: 2023-12-13

## 2023-12-13 RX ADMIN — SODIUM CHLORIDE, SODIUM LACTATE, POTASSIUM CHLORIDE, CALCIUM CHLORIDE 999 ML/HR: 600; 310; 30; 20 INJECTION, SOLUTION INTRAVENOUS at 15:18

## 2023-12-13 RX ADMIN — SODIUM CHLORIDE, SODIUM LACTATE, POTASSIUM CHLORIDE, CALCIUM CHLORIDE 999 ML/HR: 600; 310; 30; 20 INJECTION, SOLUTION INTRAVENOUS at 16:19

## 2023-12-13 ASSESSMENT — ENCOUNTER SYMPTOMS
NAUSEA: 1
ENDOCRINE NEGATIVE: 1
EYES NEGATIVE: 1
MUSCULOSKELETAL NEGATIVE: 1
HEMATOLOGIC/LYMPHATIC NEGATIVE: 1
PSYCHIATRIC NEGATIVE: 1
RESPIRATORY NEGATIVE: 1
CARDIOVASCULAR NEGATIVE: 1
CONSTITUTIONAL NEGATIVE: 1
NEUROLOGICAL NEGATIVE: 1
CONSTIPATION: 1

## 2023-12-13 NOTE — PATIENT INSTRUCTIONS
Thank you for choosing [facility] and letting us take care of you today!    NEXT APPOINTMENT NEEDED FOR PATIENT: N/A Follow up with Dr. Contreras 12/14/2023    If you need to call us for any reason: Cancel, Schedule, or Reschedule please call us at [phone].     Our hours of operation are Monday through Friday: 8am-8pm, and Saturdays from 8am-4pm. We are closed on Sundays and Holidays.        Please call us if you are sick, have surgery, dental work, or your physician wants your visit rescheduled.     Any insurance changes will need approval from your new insurance and this can take up to two weeks.     If you call the center and no one answers please leave us a voicemail and someone from our office with get back to you within 24 hours. If you call on a weekend or a holiday we will return your call back on the next open business day.    Go to the Emergency Department or call 911 if you experience the following:    -You have signs of an allergic reaction  -Rash, hives, itching  -Swollen, blistered, peeling skin  -Swelling of face, lips, mouth, tongue or throat  -Tightness of chest, trouble breathing, swallowing, or talking    Call your doctor:     -If IV/Injection site gets warm, swollen, itchy or leaks fluid or pus  (Leave dressing on your IV site for at least 2 hours and keep the area clean and dry    -If you get sick or have symptoms of infection or are not feeling well for any reason    -If you have any side effects from your medication that do not go away or are bothersome.    -Some side effects may include: Stuffy nose, headache, feeling tired, muscle aches, and upset stomach.

## 2023-12-13 NOTE — PROGRESS NOTES
[facility]   Infusion Clinic Note   Date: 2023   Name: Juana Christianson  : 1980   MRN: [id number]         Reason for Visit: OP Infusion (Lactated Ringers Infusion)      Accompanied by:Self   Visit Type:: Infusion   Diagnosis: Dehydration    Nausea and vomiting, unspecified vomiting type    S/P gastric bypass    Allergies:   Allergies as of 2023    (No Known Allergies)      Current [facility] has a current medication list which includes the following prescription(s): amphetamine-dextroamphetamine xr, famotidine, multivitamin/iron/folic acid, omeprazole, ondansetron odt, prednisone, and promethazine, and the following Facility-Administered Medications: lactated ringer's and ondansetron.        Vitals:  Vitals:    23 1505   BP: 122/79   Pulse: 74   Resp: 16   Temp: 36.6 °C (97.9 °F)   SpO2: 99%      Infusion Pre-procedure Checklist   Allergies reviewed: yes   Medications reviewed: yes   Contraindications to treatment:No   Previous reaction to current treatment:No   Current Health Issues: None   Pain: '    Is the pain different from normal: No   Is the pain tolerable: n/a   Is your Doctor aware: n/a   Contraindications based on patient history: No   Provider notified: Not applicable   Labs: None   Fall Risk Screening:      Review of Systems   Constitutional: Negative.    HENT:  Negative.     Eyes: Negative.    Respiratory: Negative.     Cardiovascular: Negative.    Gastrointestinal:  Positive for constipation and nausea.   Endocrine: Negative.    Genitourinary: Negative.     Musculoskeletal: Negative.    Skin: Negative.    Neurological: Negative.    Hematological: Negative.    Psychiatric/Behavioral: Negative.        Negative for complaint: [] all other systems have been reviewed and are negative for complaint   Infusion Readiness:   Assessment Concerns Related to Infusion: Yes  Provider notified: n/a  Assess patient for the concerns below. Document provider notification  as appropriate:  - Does not meet criteria to treat Yes  - Has an active or recent infection with/without current antibiotic use No  - Has recent/planned dental work No  - Has recent/planned surgeries No  - Has recently received or plans to receive vaccinations No  - Has treatment related toxicities No  - Is pregnant (unless noted otherwise) No    Initiated By: AMANDA Osorio-CNP   Time: 3:20 PM     We administered lactated Ringer's.

## 2023-12-14 ENCOUNTER — TELEPHONE (OUTPATIENT)
Dept: SURGERY | Facility: CLINIC | Age: 43
End: 2023-12-14

## 2023-12-14 ENCOUNTER — OFFICE VISIT (OUTPATIENT)
Dept: SURGERY | Facility: CLINIC | Age: 43
End: 2023-12-14
Payer: COMMERCIAL

## 2023-12-14 DIAGNOSIS — K44.9 HIATAL HERNIA: ICD-10-CM

## 2023-12-14 DIAGNOSIS — Z98.84 S/P GASTRIC BYPASS: Primary | ICD-10-CM

## 2023-12-14 DIAGNOSIS — R11.2 NAUSEA AND VOMITING, UNSPECIFIED VOMITING TYPE: ICD-10-CM

## 2023-12-14 DIAGNOSIS — K21.9 CHRONIC GASTROESOPHAGEAL REFLUX DISEASE: ICD-10-CM

## 2023-12-14 DIAGNOSIS — K21.9 GASTROESOPHAGEAL REFLUX DISEASE, UNSPECIFIED WHETHER ESOPHAGITIS PRESENT: ICD-10-CM

## 2023-12-14 PROCEDURE — 99024 POSTOP FOLLOW-UP VISIT: CPT | Performed by: SURGERY

## 2023-12-14 PROCEDURE — 1036F TOBACCO NON-USER: CPT | Performed by: SURGERY

## 2023-12-14 PROCEDURE — 3008F BODY MASS INDEX DOCD: CPT | Performed by: SURGERY

## 2023-12-14 NOTE — TELEPHONE ENCOUNTER
Spoke with patient regarding EGD with dilation tomorrow and subsequent fluid infusions.  We can go day by day next week if she is feeling a lot better, she does not need daily infusion as long as she is meeting 64 ounces of PO intake.  Patient verbalized understanding will be at [facility] at 7am tomorrow.

## 2023-12-15 ENCOUNTER — ANESTHESIA EVENT (OUTPATIENT)
Dept: GASTROENTEROLOGY | Facility: HOSPITAL | Age: 43
End: 2023-12-15
Payer: COMMERCIAL

## 2023-12-15 ENCOUNTER — ANESTHESIA (OUTPATIENT)
Dept: GASTROENTEROLOGY | Facility: HOSPITAL | Age: 43
End: 2023-12-15
Payer: COMMERCIAL

## 2023-12-15 ENCOUNTER — HOSPITAL ENCOUNTER (OUTPATIENT)
Dept: GASTROENTEROLOGY | Facility: HOSPITAL | Age: 43
Discharge: HOME | End: 2023-12-15
Payer: COMMERCIAL

## 2023-12-15 VITALS
DIASTOLIC BLOOD PRESSURE: 71 MMHG | RESPIRATION RATE: 23 BRPM | SYSTOLIC BLOOD PRESSURE: 123 MMHG | WEIGHT: 125 LBS | OXYGEN SATURATION: 98 % | HEIGHT: 62 IN | TEMPERATURE: 97.5 F | HEART RATE: 63 BPM | BODY MASS INDEX: 23 KG/M2

## 2023-12-15 DIAGNOSIS — R60.9 SWELLING: ICD-10-CM

## 2023-12-15 DIAGNOSIS — R13.10 DYSPHAGIA, UNSPECIFIED TYPE: ICD-10-CM

## 2023-12-15 DIAGNOSIS — R11.2 POST-OPERATIVE NAUSEA AND VOMITING: Primary | ICD-10-CM

## 2023-12-15 DIAGNOSIS — Z98.890 POST-OPERATIVE NAUSEA AND VOMITING: Primary | ICD-10-CM

## 2023-12-15 DIAGNOSIS — Z98.84 S/P GASTRIC BYPASS: ICD-10-CM

## 2023-12-15 PROCEDURE — A43249 PR EDG BALLOON DILATION ESOPHAGUS <30 MM DIAM: Performed by: ANESTHESIOLOGIST ASSISTANT

## 2023-12-15 PROCEDURE — 2500000002 HC RX 250 W HCPCS SELF ADMINISTERED DRUGS (ALT 637 FOR MEDICARE OP, ALT 636 FOR OP/ED)

## 2023-12-15 PROCEDURE — 7100000010 HC PHASE TWO TIME - EACH INCREMENTAL 1 MINUTE: Performed by: SURGERY

## 2023-12-15 PROCEDURE — 2720000007 HC OR 272 NO HCPCS: Performed by: SURGERY

## 2023-12-15 PROCEDURE — 7100000009 HC PHASE TWO TIME - INITIAL BASE CHARGE: Performed by: SURGERY

## 2023-12-15 PROCEDURE — 43249 ESOPH EGD DILATION <30 MM: CPT | Performed by: SURGERY

## 2023-12-15 PROCEDURE — A43249 PR EDG BALLOON DILATION ESOPHAGUS <30 MM DIAM: Performed by: ANESTHESIOLOGY

## 2023-12-15 PROCEDURE — C1726 CATH, BAL DIL, NON-VASCULAR: HCPCS | Performed by: SURGERY

## 2023-12-15 PROCEDURE — 3700000001 HC GENERAL ANESTHESIA TIME - INITIAL BASE CHARGE: Performed by: SURGERY

## 2023-12-15 PROCEDURE — 2500000004 HC RX 250 GENERAL PHARMACY W/ HCPCS (ALT 636 FOR OP/ED): Performed by: ANESTHESIOLOGIST ASSISTANT

## 2023-12-15 PROCEDURE — 2500000004 HC RX 250 GENERAL PHARMACY W/ HCPCS (ALT 636 FOR OP/ED): Performed by: STUDENT IN AN ORGANIZED HEALTH CARE EDUCATION/TRAINING PROGRAM

## 2023-12-15 PROCEDURE — 3700000002 HC GENERAL ANESTHESIA TIME - EACH INCREMENTAL 1 MINUTE: Performed by: SURGERY

## 2023-12-15 RX ORDER — MIDAZOLAM HYDROCHLORIDE 1 MG/ML
INJECTION, SOLUTION INTRAMUSCULAR; INTRAVENOUS AS NEEDED
Status: DISCONTINUED | OUTPATIENT
Start: 2023-12-15 | End: 2023-12-15

## 2023-12-15 RX ORDER — IPRATROPIUM BROMIDE AND ALBUTEROL SULFATE 2.5; .5 MG/3ML; MG/3ML
3 SOLUTION RESPIRATORY (INHALATION)
Status: COMPLETED | OUTPATIENT
Start: 2023-12-15 | End: 2023-12-15

## 2023-12-15 RX ORDER — IPRATROPIUM BROMIDE AND ALBUTEROL SULFATE 2.5; .5 MG/3ML; MG/3ML
SOLUTION RESPIRATORY (INHALATION)
Status: COMPLETED
Start: 2023-12-15 | End: 2023-12-15

## 2023-12-15 RX ORDER — SODIUM CHLORIDE, SODIUM LACTATE, POTASSIUM CHLORIDE, CALCIUM CHLORIDE 600; 310; 30; 20 MG/100ML; MG/100ML; MG/100ML; MG/100ML
20 INJECTION, SOLUTION INTRAVENOUS CONTINUOUS
Status: DISCONTINUED | OUTPATIENT
Start: 2023-12-15 | End: 2023-12-16 | Stop reason: HOSPADM

## 2023-12-15 RX ORDER — PROPOFOL 10 MG/ML
INJECTION, EMULSION INTRAVENOUS CONTINUOUS PRN
Status: DISCONTINUED | OUTPATIENT
Start: 2023-12-15 | End: 2023-12-15

## 2023-12-15 RX ORDER — PROMETHAZINE HYDROCHLORIDE 12.5 MG/1
12.5 TABLET ORAL EVERY 6 HOURS PRN
Qty: 28 TABLET | Refills: 0 | Status: SHIPPED | OUTPATIENT
Start: 2023-12-15 | End: 2024-02-16 | Stop reason: ALTCHOICE

## 2023-12-15 RX ORDER — ONDANSETRON HYDROCHLORIDE 2 MG/ML
INJECTION, SOLUTION INTRAVENOUS AS NEEDED
Status: DISCONTINUED | OUTPATIENT
Start: 2023-12-15 | End: 2023-12-15

## 2023-12-15 RX ORDER — PROPOFOL 10 MG/ML
INJECTION, EMULSION INTRAVENOUS AS NEEDED
Status: DISCONTINUED | OUTPATIENT
Start: 2023-12-15 | End: 2023-12-15

## 2023-12-15 RX ADMIN — PROPOFOL 100 MCG/KG/MIN: 10 INJECTION, EMULSION INTRAVENOUS at 08:07

## 2023-12-15 RX ADMIN — IPRATROPIUM BROMIDE AND ALBUTEROL SULFATE 3 ML: .5; 3 SOLUTION RESPIRATORY (INHALATION) at 08:59

## 2023-12-15 RX ADMIN — PROPOFOL 60 MG: 10 INJECTION, EMULSION INTRAVENOUS at 08:07

## 2023-12-15 RX ADMIN — ONDANSETRON 4 MG: 2 INJECTION, SOLUTION INTRAMUSCULAR; INTRAVENOUS at 08:05

## 2023-12-15 RX ADMIN — MIDAZOLAM 2 MG: 1 INJECTION INTRAMUSCULAR; INTRAVENOUS at 08:05

## 2023-12-15 RX ADMIN — IPRATROPIUM BROMIDE AND ALBUTEROL SULFATE 3 ML: 2.5; .5 SOLUTION RESPIRATORY (INHALATION) at 08:59

## 2023-12-15 RX ADMIN — PROPOFOL 20 MG: 10 INJECTION, EMULSION INTRAVENOUS at 08:08

## 2023-12-15 RX ADMIN — SODIUM CHLORIDE, SODIUM LACTATE, POTASSIUM CHLORIDE, AND CALCIUM CHLORIDE: 600; 310; 30; 20 INJECTION, SOLUTION INTRAVENOUS at 08:04

## 2023-12-15 SDOH — HEALTH STABILITY: MENTAL HEALTH: CURRENT SMOKER: 0

## 2023-12-15 ASSESSMENT — COLUMBIA-SUICIDE SEVERITY RATING SCALE - C-SSRS
1. IN THE PAST MONTH, HAVE YOU WISHED YOU WERE DEAD OR WISHED YOU COULD GO TO SLEEP AND NOT WAKE UP?: NO
2. HAVE YOU ACTUALLY HAD ANY THOUGHTS OF KILLING YOURSELF?: NO
6. HAVE YOU EVER DONE ANYTHING, STARTED TO DO ANYTHING, OR PREPARED TO DO ANYTHING TO END YOUR LIFE?: NO

## 2023-12-15 ASSESSMENT — PAIN SCALES - GENERAL
PAINLEVEL_OUTOF10: 0 - NO PAIN
PAINLEVEL_OUTOF10: 5 - MODERATE PAIN

## 2023-12-15 NOTE — ANESTHESIA POSTPROCEDURE EVALUATION
Patient: Juana Christianson    Procedure Summary       Date: 12/15/23 Room / Location: [facility]    Anesthesia Start: 0804 Anesthesia Stop: 0840    Procedure: EGD Diagnosis:       Post-operative nausea and vomiting      Swelling      Dysphagia, unspecified type      S/P gastric bypass      Post-operative nausea and vomiting    Scheduled Providers: Riaz Contreras MD; Hans Vaughan MD Responsible Provider: Hans Vaughan MD    Anesthesia Type: MAC ASA Status: 2            Anesthesia Type: MAC    Vitals Value Taken Time   /71 12/15/23 0900   Temp    12/15/23 0952   Pulse 63 12/15/23 0915   Resp 23 12/15/23 0915   SpO2 98 % 12/15/23 0915       Anesthesia Post Evaluation    Patient location during evaluation: PACU  Patient participation: complete - patient participated  Level of consciousness: awake and alert  Pain management: satisfactory to patient  Airway patency: patent  Cardiovascular status: acceptable  Respiratory status: acceptable  Hydration status: acceptable  Postoperative Nausea and Vomiting: none        Encounter Notable Events   Notable Event Outcome Phase Comment   Desaturation < 90% for over 3 min or < 80% for over 1 min Resolved in Room Intraprocedure See intraoperative note

## 2023-12-15 NOTE — ANESTHESIA PREPROCEDURE EVALUATION
Patient: Juana Christianson    Procedure Information       Date/Time: 12/15/23 0800    Scheduled providers: Riaz Contreras MD    Procedure: EGD    Location: [facility]            Relevant Problems   GI   (+) Chronic gastroesophageal reflux disease   (+) GERD (gastroesophageal reflux disease)   (+) Hiatal hernia      Neuro/Psych   (+) Carpal tunnel syndrome      Musculoskeletal   (+) Carpal tunnel syndrome       Clinical information reviewed:    Allergies  Meds     OB Status           NPO Detail:  NPO/Void Status  Date of Last Liquid: 12/14/23  Time of Last Liquid: 2100  Date of Last Solid: 12/04/23  Time of Last Solid: 1800         Physical Exam    Airway  Mallampati: II  TM distance: >3 FB  Neck ROM: full     Cardiovascular   Rhythm: regular  Rate: normal     Dental   Comments: Upper partial   Pulmonary   Breath sounds clear to auscultation     Abdominal            Anesthesia Plan    ASA 2     MAC     The patient is not a current smoker.    intravenous induction   Anesthetic plan and risks discussed with patient.    Plan discussed with attending.

## 2023-12-22 ENCOUNTER — NUTRITION (OUTPATIENT)
Dept: SURGERY | Facility: CLINIC | Age: 43
End: 2023-12-22
Payer: COMMERCIAL

## 2023-12-22 ENCOUNTER — APPOINTMENT (OUTPATIENT)
Dept: SURGERY | Facility: CLINIC | Age: 43
End: 2023-12-22
Payer: COMMERCIAL

## 2023-12-22 VITALS — BODY MASS INDEX: 22.19 KG/M2 | WEIGHT: 121.3 LBS

## 2023-12-22 DIAGNOSIS — Z98.84 S/P GASTRIC BYPASS: Primary | ICD-10-CM

## 2023-12-22 NOTE — PROGRESS NOTES
Surgery Date:  11/15/23  Surgeon:  Ben  Procedure:  conversion of SG to RNY GB, HHR        ASSESSMENT:  Current weight:     121.3 lbs             Ht:    62  in.        BMI:  22.1  Previous weight:   127 lbs  Initial start weight:   141 lbs    PROGRESS:    Nutrition Interventions for last encounter (date):   1. Continue to drink your protein shakes to meet your goal of 60-70 g of protein per day. Begin measuring how much protein you can eat on the soft diet so that you know when to start weaning off of your protein shakes.   2. Continue to drink 64 oz. of zero calorie beverages per day  3. Continue no drinking 30 min before, during the meal and for 30 minutes after the meal  4. Continue to exercise  5. Advance to the puree diet for 1 week, then soft food for 3 weeks  6. Remember to eat slowly and chew thoroughly  7. Try one new food at a time to test for any intolerances.   8. Continue to take all of your vitamins and minerals.     CHANGES IN TREATMENT:   Patient met goals:  Partially   Actions to meet previous goals:     24 hour food recall: soft foods since Monday, 60 grams    Breakfast:  protein shake  Snack:   Lunch: spaghetti,   Snack:   protein shake   Dinner:  3-4 bites baked chicken   Snack:   Beverages:  water w/ crystal light, protein shake  Alcohol: none      Vitamins:  x2 chewable MVI,  1200 mg calcium, 500 mcg B12  Medications: see list  Physical Activity: walking 1 mile/day   Nausea/Vomiting/Diarrhea/Constipation: +BM daily     READINESS TO LEARN:  Motivation to learn:           Interested      Understanding of instruction: Good      Anticipated Compliance: Good       Family Support: Unable to assess-family not present     Patient presents with post-op  gastric bypass. Pt is 6 weeks postop.    Weight today is self reported.   Patient recently had dilation on 12/15/23 d/t difficulty tolerating diet. States she is doing much better now. Hoping to maintain weight.   Started soft foods this week,  tolerating well, taking it slow and minimal intake. Continues to supplement with 2 protein shakes/day. Patient feels she is doing better. Encouraged to continue on soft foods and advance as tolerated to regular in next week.         Malnutrition Screening  Significant unintentional weight loss? n/a  Eating less than 75% of usual intake for more than 2 weeks? n/a    Nutrition Diagnosis:   1. Increased protein and nutrition needs related to altered GI function as evidenced by pt. s/p sleeve gastrectomy/gastric bypass.  2. Food- and nutrition-related knowledge deficit related to lack of prior exposure to surgical weight loss information as evidenced by diet recall.     Nutrition Interventions:   1. Modify type and amount of food and nutrients within meals and snacks.  2. Comprehensive Nutrition Education  -Nutrition education materials: SG Schedule        Recommendations:    Continue on Soft foods for the next week. When you feel ready, you may SLOWLY transition to regular foods if tolerated. You can always go back to soft.   2. Continue to eat protein rich foods first at meals to meet your goal of  60-70 g of protein per day  3.  Try one new food at a time and remember to chew, chew, chew!  4. Drink 64 oz. of zero calorie beverages per day  5. Continue no drinking 30 min before, during the meal and for 30 minutes after the meal  6.  Begin structured exercise and build intensity and duration as tolerated.   7.  Continue current vit/min regimen  8. Attend our Virtual Support Group monthly!    Nutrition Monitoring and Evaluation:   1-2 pounds weight loss per week  Criteria: weight check, food recall  Need for Follow-up: 3 months

## 2024-01-03 NOTE — PROGRESS NOTES
[facility]AT[facility]GERY CLINIC  FOLLOW UP NOTE      Name: Juana Christianson  MRN: [id number]      Index Surgery  Date of Surgery: 11/15/2023   Surgeon: Dr. Riaz Contreras    Surgical Procedure: Revision: Sleeve to Bypass 15030 and Hiatal hernia repair 74456    HPI: 43 year old woman presenting today to follow up on a CT scan performed at [facility].  Patient was seen for chest pain which resolved spontaneously.  [facility] ER called the patient after discharge informing her that she has a penetrating ulcer and she needed to return to the ER. Patient denies any fevers, abdominal pain, or vomiting.  Tolerating regular diet.       Exam Date Time Procedure Performing Provider Status   12/29/23 11:12 PM CT Chest W/Contrast Kelsey Bruce RT (R) ARRT; Auth (Verified)   Notes:   (CT Chest W/Contrast) Reason For Exam: Chest Pain   Report  CLINICAL HISTORY: Sharp right-sided chest pain. Recent gastroesophageal surgical procedures.    COMPARISON: CT abdomen and pelvis 3/9/2019.    TECHNIQUE: Spiral enhanced images were obtained of the chest after the infusion of approximately 100 mL of Omnipaque 350 contrast, with routine multiplanar reconstructions were performed.    All CT scans at this facility use dose modulation, iterative reconstruction, and/or weight based dosing when appropriate to reduce radiation dose to as low as reasonably achievable.    Unless otherwise stated, incidental findings identified in this report do not require routine follow-up imaging.    FINDINGS:    Lungs and pleura: Approximately 1 cm noncalcified mildly irregular marginated nodule within the anterior inferior aspect of the left lower lobe abutting the pleura (axial image 85 of 124); the area not included on 3/7/2019. A follow-up chest CT in 3 months is suggested; unless outside studies can be made available for comparison. No other nodules, focal consolidation, pleural effusion, or pneumothorax.  Esophagus: Moderate circumferential wall thickening  distally. Approximately 2 cm ovoid structure posterior and to the right of the distal esophagus with mildly enhancing thickened wall and low density center (axial image 94 of 124, coronal image 61 of 112), which was not present on 3/7/2019.  Mediastinum & lymph nodes: No other pathologically enlarged mediastinal, hilar, or axillary lymph nodes.  Pulmonary arteries: Normal in caliber without filling defects identified to suggest pulmonary emboli.  Thoracic aorta: Normal in caliber with minimal atherosclerotic plaquing. There is no dissection.  Heart: Not enlarged. No significant coronary artery calcifications identified. No significant pericardial effusion.  Thyroid: 1 cm mildly hypodense nodule. Mildly hypoplastic left lobe. Otherwise, unremarkable.  Musculoskeletal: No displaced fractures or worrisome bone destruction identified. Minimal to mild degenerative changes.  Upper abdomen: Previous cholecystectomy and NOE-EN-Y gastric bypass surgery, which are otherwise unremarkable.      IMPRESSION:    MODERATE NONSPECIFIC WALL THICKENING OF THE DISTAL ESOPHAGUS WITH ADJACENT 2 CM OVOID STRUCTURE POSTERIORLY ON THE RIGHT, WHICH MAY BE A POSTSURGICAL COLLECTION. OTHER POSSIBILITIES SUCH AS A PENETRATING ULCER, MALIGNANCY, OR LYMPHADENOPATHY ARE ALSO CONSIDERATIONS. CORRELATION WITH OUTSIDE PERISURGICAL IMAGING IS SUGGESTED.    APPROXIMATELY 1 CM LEFT UPPER LOBE PULMONARY NODULE. A 3 MONTH FOLLOW-UP CT OR CORRELATION TO OUTSIDE IMAGING IS SUGGESTED.  ***** Final *****     Diet Regular     Exercise Ambulating - back to work     Concerns related to:  Nausea/Vomiting, Reflux: Yes  Abdominal Pain: No  Diarrhea/Constipation No    DAILY SUPPLEMENTS:  Calcium: Calcium Citrate w/ vitamin D (1200 - 1500mg)  Multivitamin & Minerals: 2 per day  Vitamin B12: 500 mcg/day   PPI:BID Omeprazole started 12/11/23      Current Outpatient Medications   Medication Sig Dispense Refill    amphetamine-dextroamphetamine XR (Adderall XR) 15 mg 24  hr capsule Take 1 capsule (15 mg) by mouth once daily in the morning.      famotidine (Pepcid) 20 mg tablet Take 1 tablet (20 mg) by mouth 2 times a day as needed.      multivitamin/iron/folic acid (CENTRUM WOMEN ORAL) Take 1 capsule by mouth once daily.      omeprazole (PriLOSEC) 40 mg DR capsule Take 1 capsule (40 mg) by mouth once daily in the morning. Take before meals. Do not crush or chew. Open capsule, sprinkle beads on SF jello, pudding or applesauce. 90 capsule 1    ondansetron ODT (Zofran-ODT) 4 mg disintegrating tablet Take 1 tablet (4 mg) by mouth every 6 hours if needed for nausea or vomiting. 60 tablet 1    promethazine (Phenergan) 12.5 mg tablet Take 1 tablet (12.5 mg) by mouth every 6 hours if needed for nausea or vomiting. 28 tablet 0     No current facility-administered medications for this visit.       Comorbidities:  Patient Active Problem List   Diagnosis    GERD (gastroesophageal reflux disease)    Hiatal hernia    Carpal tunnel syndrome    Chronic gastroesophageal reflux disease    S/P gastric bypass    Nausea and vomiting    Dehydration         REVIEW OF SYSTEMS:  CONSTITUTIONAL: Patient denies fevers, chills, sweats and weight changes.  CARDIOVASCULAR: Patient denies chest pains, palpitations, orthopnea and paroxysmal nocturnal dyspnea.  RESPIRATORY: No dyspnea on exertion, no wheezing or cough.  GI: No nausea, vomiting, diarrhea, constipation, abdominal pain, hematochezia or melena.  MUSCULOSKELETAL: No myalgias or arthralgias.  NEUROLOGIC: No chronic headaches, no seizures. Patient denies numbness, tingling or weakness.  PSYCHIATRIC: Patient denies problems with mood disturbance. No problems with anxiety.  ENDOCRINE: No excessive urination or excessive thirst.  DERMATOLOGIC: Patient denies any rashes or skin changes.    PHYSICAL EXAM:  There were no vitals taken for this visit.  Alert, well appearing, no acute distress, nourished, hydrated.  Anicteric sclera, no ptosis  Facial  symmetry  Neck supple  Unlabored respirations.  Easily conversant without increased respiratory effort  Oriented to person, place, time.  Judgement intact.  Appropriate mood, affect.       ASSESSMENT & PLAN:  43 y.o. female presenting for follow up visit s/p Sleeve to Bypass conversion with HHR 11/15/2023. Presenting today to follow up on a CT scan performed at [facility].  Patient was seen for chest pain which resolved spontaneously.  [facility] ER called the patient after discharge informing her that she has a penetrating ulcer and she needed to return to the ER. Patient denies any fevers, abdominal pain, or vomiting.  Tolerating regular diet.

## 2024-01-04 DIAGNOSIS — K21.9 GASTROESOPHAGEAL REFLUX DISEASE, UNSPECIFIED WHETHER ESOPHAGITIS PRESENT: ICD-10-CM

## 2024-01-04 DIAGNOSIS — K44.9 HIATAL HERNIA: ICD-10-CM

## 2024-01-04 NOTE — PROGRESS NOTES
BARIATRIC SURGERY CLINIC  FOLLOW UP NOTE      Name: Juana Christianson  MRN: [id number]      Index Surgery  Date of Surgery: 11/15/2023   Surgeon: Dr. Riaz Contreras    Surgical Procedure: Revision: Sleeve to Bypass 38634 and Hiatal hernia repair 34402    HPI: 43 year old woman presenting today to follow up on a CT scan performed at [facility].  Patient was seen for chest pain which resolved spontaneously.  [facility] ER called the patient after discharge informing her that she has a penetrating ulcer and she needed to return to the ER. Patient denies any fevers, abdominal pain, or vomiting.  Tolerating regular diet.  Patient subsequently was ordered an Upper GI as well - here to review.       Exam Date Time Procedure Performing Provider Status   12/29/23 11:12 PM CT Chest W/Contrast Kelsey Bruce RT (R) ARRT; Auth (Verified)   Notes:   (CT Chest W/Contrast) Reason For Exam: Chest Pain   Report  CLINICAL HISTORY: Sharp right-sided chest pain. Recent gastroesophageal surgical procedures.    COMPARISON: CT abdomen and pelvis 3/9/2019.    TECHNIQUE: Spiral enhanced images were obtained of the chest after the infusion of approximately 100 mL of Omnipaque 350 contrast, with routine multiplanar reconstructions were performed.    All CT scans at this facility use dose modulation, iterative reconstruction, and/or weight based dosing when appropriate to reduce radiation dose to as low as reasonably achievable.    Unless otherwise stated, incidental findings identified in this report do not require routine follow-up imaging.    FINDINGS:    Lungs and pleura: Approximately 1 cm noncalcified mildly irregular marginated nodule within the anterior inferior aspect of the left lower lobe abutting the pleura (axial image 85 of 124); the area not included on 3/7/2019. A follow-up chest CT in 3 months is suggested; unless outside studies can be made available for comparison. No other nodules, focal consolidation, pleural  effusion, or pneumothorax.  Esophagus: Moderate circumferential wall thickening distally. Approximately 2 cm ovoid structure posterior and to the right of the distal esophagus with mildly enhancing thickened wall and low density center (axial image 94 of 124, coronal image 61 of 112), which was not present on 3/7/2019.  Mediastinum & lymph nodes: No other pathologically enlarged mediastinal, hilar, or axillary lymph nodes.  Pulmonary arteries: Normal in caliber without filling defects identified to suggest pulmonary emboli.  Thoracic aorta: Normal in caliber with minimal atherosclerotic plaquing. There is no dissection.  Heart: Not enlarged. No significant coronary artery calcifications identified. No significant pericardial effusion.  Thyroid: 1 cm mildly hypodense nodule. Mildly hypoplastic left lobe. Otherwise, unremarkable.  Musculoskeletal: No displaced fractures or worrisome bone destruction identified. Minimal to mild degenerative changes.  Upper abdomen: Previous cholecystectomy and NOE-EN-Y gastric bypass surgery, which are otherwise unremarkable.      IMPRESSION:    MODERATE NONSPECIFIC WALL THICKENING OF THE DISTAL ESOPHAGUS WITH ADJACENT 2 CM OVOID STRUCTURE POSTERIORLY ON THE RIGHT, WHICH MAY BE A POSTSURGICAL COLLECTION. OTHER POSSIBILITIES SUCH AS A PENETRATING ULCER, MALIGNANCY, OR LYMPHADENOPATHY ARE ALSO CONSIDERATIONS. CORRELATION WITH OUTSIDE PERISURGICAL IMAGING IS SUGGESTED.    APPROXIMATELY 1 CM LEFT UPPER LOBE PULMONARY NODULE. A 3 MONTH FOLLOW-UP CT OR CORRELATION TO OUTSIDE IMAGING IS SUGGESTED.  ***** Final *****     UPPER GI W/KUB:  01/10/24  FINDINGS:  Initial  image demonstrates  nonobstructive bowel gas pattern.  Right upper quadrant cholecystectomy clips and left mid abdominal  suture line are noted.      The esophagus is normal in caliber and contour. No focal mucosal  abnormality is identified. There is mild esophageal dysmotility. With  early breaking of the primary wave.  There was incomplete emptying of  contrast of the distal esophagus into the stomach on some a swallows.      Post Kevin-en-Y gastric bypass. The gastric pouch is small. There is  prompt flow of contrast across the gastrojejunal anastomosis.  Proximal loops of jejunum appear unremarkable. There is no evidence  of leak or fistula. No spontaneous or provoked gastroesophageal  reflux was observed.      IMPRESSION:  1.  Normal gross morphology of the esophagus.  Mild esophageal dysmotility which may be age related.  2. Post Kevin-en-Y gastric bypass. Small sliding hiatal hernia only  present when patient is prone. No spontaneous or provoked  gastroesophageal reflux during the exam. No apparent complication. No  evidence of leak, fistula, or obstruction.  3. Abnormality along the right aspect of the distal esophagus is  present on CTs from 12/08/2023 and 12/29/2023. Because this persists  over 2 exams, and no abnormality of the esophagus was identified in  this region today, this could be an extraluminal finding such as an  enlarged lymph node. Consider further evaluation with repeat CT to  demonstrate continued persistence, PET/CT, or endoscopic ultrasound.        Diet Regular     Exercise Ambulating - back to work     Concerns related to:  Nausea/Vomiting, Reflux: Yes  Abdominal Pain: No  Diarrhea/Constipation No    DAILY SUPPLEMENTS:  Calcium: Calcium Citrate w/ vitamin D (1200 - 1500mg)  Multivitamin & Minerals: 2 per day  Vitamin B12: 500 mcg/day   PPI:BID Omeprazole started 12/11/23      Current Outpatient Medications   Medication Sig Dispense Refill    amphetamine-dextroamphetamine XR (Adderall XR) 15 mg 24 hr capsule Take 1 capsule (15 mg) by mouth once daily in the morning.      famotidine (Pepcid) 20 mg tablet Take 1 tablet (20 mg) by mouth 2 times a day as needed.      multivitamin/iron/folic acid (CENTRUM WOMEN ORAL) Take 1 capsule by mouth once daily.      omeprazole (PriLOSEC) 40 mg DR capsule Take 1 capsule  (40 mg) by mouth once daily in the morning. Take before meals. Do not crush or chew. Open capsule, sprinkle beads on SF jello, pudding or applesauce. 90 capsule 1    ondansetron ODT (Zofran-ODT) 4 mg disintegrating tablet Take 1 tablet (4 mg) by mouth every 6 hours if needed for nausea or vomiting. 60 tablet 1    promethazine (Phenergan) 12.5 mg tablet Take 1 tablet (12.5 mg) by mouth every 6 hours if needed for nausea or vomiting. 28 tablet 0     No current facility-administered medications for this visit.       Comorbidities:  Patient Active Problem List   Diagnosis    GERD (gastroesophageal reflux disease)    Hiatal hernia    Carpal tunnel syndrome    Chronic gastroesophageal reflux disease    S/P gastric bypass    Nausea and vomiting    Dehydration         REVIEW OF SYSTEMS:  CONSTITUTIONAL: Patient denies fevers, chills, sweats and weight changes.  CARDIOVASCULAR: Patient denies chest pains, palpitations, orthopnea and paroxysmal nocturnal dyspnea.  RESPIRATORY: No dyspnea on exertion, no wheezing or cough.  GI: No nausea, vomiting, diarrhea, constipation, abdominal pain, hematochezia or melena.  MUSCULOSKELETAL: No myalgias or arthralgias.  NEUROLOGIC: No chronic headaches, no seizures. Patient denies numbness, tingling or weakness.  PSYCHIATRIC: Patient denies problems with mood disturbance. No problems with anxiety.  ENDOCRINE: No excessive urination or excessive thirst.  DERMATOLOGIC: Patient denies any rashes or skin changes.    PHYSICAL EXAM:  There were no vitals taken for this visit.  Alert, well appearing, no acute distress, nourished, hydrated.  Anicteric sclera, no ptosis  Facial symmetry  Neck supple  Unlabored respirations.  Easily conversant without increased respiratory effort  Oriented to person, place, time.  Judgement intact.  Appropriate mood, affect.       ASSESSMENT & PLAN:  43 y.o. female presenting for follow up visit s/p Sleeve to Bypass conversion with R 11/15/2023. Presenting today  to follow up on a CT scan performed at [facility].  Patient was seen for chest pain which resolved spontaneously.  [facility] ER called the patient after discharge informing her that she has a penetrating ulcer and she needed to return to the ER. Patient denies any fevers, abdominal pain, or vomiting.  Tolerating regular diet.         PLAN:

## 2024-01-05 ENCOUNTER — APPOINTMENT (OUTPATIENT)
Dept: SURGERY | Facility: CLINIC | Age: 44
End: 2024-01-05
Payer: COMMERCIAL

## 2024-01-10 ENCOUNTER — HOSPITAL ENCOUNTER (OUTPATIENT)
Dept: RADIOLOGY | Facility: HOSPITAL | Age: 44
Discharge: HOME | End: 2024-01-10
Payer: COMMERCIAL

## 2024-01-10 ENCOUNTER — DOCUMENTATION (OUTPATIENT)
Dept: SURGERY | Facility: HOSPITAL | Age: 44
End: 2024-01-10
Payer: COMMERCIAL

## 2024-01-10 DIAGNOSIS — Z98.84 S/P GASTRIC BYPASS: ICD-10-CM

## 2024-01-10 DIAGNOSIS — R93.89 ABNORMAL CT SCAN: ICD-10-CM

## 2024-01-10 DIAGNOSIS — K44.9 HIATAL HERNIA: ICD-10-CM

## 2024-01-10 DIAGNOSIS — K21.9 GASTROESOPHAGEAL REFLUX DISEASE, UNSPECIFIED WHETHER ESOPHAGITIS PRESENT: ICD-10-CM

## 2024-01-10 PROCEDURE — 3430000001 HC RX 343 DIAGNOSTIC RADIOPHARMACEUTICALS: Performed by: SURGERY

## 2024-01-10 PROCEDURE — 74240 X-RAY XM UPR GI TRC 1CNTRST: CPT | Performed by: RADIOLOGY

## 2024-01-10 PROCEDURE — 74240 X-RAY XM UPR GI TRC 1CNTRST: CPT

## 2024-01-10 PROCEDURE — 2500000001 HC RX 250 WO HCPCS SELF ADMINISTERED DRUGS (ALT 637 FOR MEDICARE OP): Performed by: SURGERY

## 2024-01-10 PROCEDURE — A9698 NON-RAD CONTRAST MATERIALNOC: HCPCS | Performed by: SURGERY

## 2024-01-10 RX ADMIN — BARIUM SULFATE 95 ML: 960 POWDER, FOR SUSPENSION ORAL at 09:27

## 2024-01-10 RX ADMIN — BARIUM SULFATE 75 ML: 980 POWDER, FOR SUSPENSION ORAL at 09:27

## 2024-01-12 ENCOUNTER — APPOINTMENT (OUTPATIENT)
Dept: SURGERY | Facility: CLINIC | Age: 44
End: 2024-01-12
Payer: COMMERCIAL

## 2024-02-05 ENCOUNTER — OFFICE VISIT (OUTPATIENT)
Dept: GASTROENTEROLOGY | Facility: CLINIC | Age: 44
End: 2024-02-05
Payer: COMMERCIAL

## 2024-02-05 VITALS
HEIGHT: 62 IN | WEIGHT: 126.3 LBS | DIASTOLIC BLOOD PRESSURE: 68 MMHG | BODY MASS INDEX: 23.24 KG/M2 | HEART RATE: 76 BPM | SYSTOLIC BLOOD PRESSURE: 106 MMHG

## 2024-02-05 DIAGNOSIS — R13.10 DYSPHAGIA, UNSPECIFIED TYPE: ICD-10-CM

## 2024-02-05 DIAGNOSIS — Z98.84 S/P GASTRIC BYPASS: Primary | ICD-10-CM

## 2024-02-05 PROCEDURE — 1036F TOBACCO NON-USER: CPT | Performed by: STUDENT IN AN ORGANIZED HEALTH CARE EDUCATION/TRAINING PROGRAM

## 2024-02-05 PROCEDURE — 3008F BODY MASS INDEX DOCD: CPT | Performed by: STUDENT IN AN ORGANIZED HEALTH CARE EDUCATION/TRAINING PROGRAM

## 2024-02-05 PROCEDURE — 99204 OFFICE O/P NEW MOD 45 MIN: CPT | Performed by: STUDENT IN AN ORGANIZED HEALTH CARE EDUCATION/TRAINING PROGRAM

## 2024-02-05 RX ORDER — SODIUM CHLORIDE, SODIUM LACTATE, POTASSIUM CHLORIDE, CALCIUM CHLORIDE 600; 310; 30; 20 MG/100ML; MG/100ML; MG/100ML; MG/100ML
20 INJECTION, SOLUTION INTRAVENOUS CONTINUOUS
Status: CANCELLED | OUTPATIENT
Start: 2024-02-05

## 2024-02-05 RX ORDER — ONDANSETRON HYDROCHLORIDE 2 MG/ML
4 INJECTION, SOLUTION INTRAVENOUS ONCE AS NEEDED
Status: CANCELLED | OUTPATIENT
Start: 2024-02-05

## 2024-02-05 NOTE — H&P (VIEW-ONLY)
Subjective     History of Present Illness:   Juana Christianson is a 44 y.o. female with a past medical history significant for a recent sleeve to Kevin-en-Y gastric bypass conversion with hiatal hernia repair on 11/15/2023.  Following her procedure, she had a great postoperative course and was doing well, until she started developing episodes of vomiting and intolerance of food intake.  On 12/15/2023 she underwent an EGD with interval dilation and underwent a CT scan for further evaluation which demonstrated a soft fluid density at the level of the GE junction.  She continues to have intermittent episodes of severe chest pain near her xiphoid process radiating to her back and right shoulder.  She has had 3 episodes since December.  There is no warning signs, and episodes gradually worsened and can last for up to 2 hours.  She has attempted PPI and NSAID therapy which she is unsure if they significantly help or not.  She presents today to discuss neck steps in her management.    Past Medical History   has a past medical history of BMI 27.0-27.9,adult (10/31/2023), H/O gastric sleeve, Hiatal hernia, Morbid obesity (CMS/HCC) (09/07/2019), S/P gastric sleeve procedure (10/31/2023), and Tobacco use disorder (09/07/2019).     Social History   reports that she quit smoking about 4 years ago. Her smoking use included cigarettes. She started smoking about 8 years ago. She has a 2.50 pack-year smoking history. She has never used smokeless tobacco. She reports that she does not currently use alcohol. She reports that she does not use drugs.     Family History  family history includes Heart disease in her father; Stroke in her mother.     Allergies  No Known Allergies    Medications  Current Outpatient Medications   Medication Instructions    amphetamine-dextroamphetamine XR (Adderall XR) 15 mg 24 hr capsule 15 mg, oral, Every morning    famotidine (PEPCID) 20 mg, oral, 2 times daily PRN    multivitamin/iron/folic acid (CENTRUM  WOMEN ORAL) 1 capsule, oral, Daily RT    omeprazole (PRILOSEC) 40 mg, oral, Daily before breakfast, Do not crush or chew. Open capsule, sprinkle beads on SF jello, pudding or applesauce.    ondansetron ODT (ZOFRAN-ODT) 4 mg, oral, Every 6 hours PRN    promethazine (PHENERGAN) 12.5 mg, oral, Every 6 hours PRN        Objective   Visit Vitals  /68   Pulse 76      Physical Exam  Vitals reviewed.   Constitutional:       General: She is awake.   Pulmonary:      Effort: Pulmonary effort is normal.      Breath sounds: Normal breath sounds.   Neurological:      Mental Status: She is alert and oriented to person, place, and time.   Psychiatric:         Attention and Perception: Attention and perception normal.         Behavior: Behavior normal.         Assessment/Plan   Juana Christianson is a 44 y.o. female who presents to GI clinic for evaluation of chest pain in the setting of recent surgical intervention and a small periesophageal lesion at the level of the GE junction.    I discussed her case with bariatric surgery team today, and reviewed imaging with the patient in the room.  Discussed that the lesion is fairly close to the esophagus would be amenable to endoscopic ultrasound with FNA if appropriate.  We also discussed that referred pain in the region can be multifactorial multiple etiologies can cause her described symptoms including jackhammer esophagus or diffuse esophageal spasms.  Considering the persistent nature of the nodule endoscopic ultrasound is warranted, however I am not confident that is etiology of her symptoms as her symptoms are fairly intermittent in nature.    I recommend she attempt therapy with peppermint oil next time she has a flare of her symptoms to treat diffuse esophageal spasms.  Will plan for an EUS for further evaluation      Jonathan Atkinson MD

## 2024-02-09 NOTE — PROGRESS NOTES
Surgery Date:  11/15/23  Surgeon:  Ben  Procedure:  conversion of SG to RNY GB, HHR         ASSESSMENT:  Current weight: 126 lbs                    Ht:   62    in.           BMI: 23.1  Previous weight:  121.3 lbs  Initial start weight:   141 lbs      PROGRESS:    Nutrition Interventions for last encounter (12/22/23)  1. Continue on Soft foods for the next week. When you feel ready, you may SLOWLY transition to regular foods if tolerated. You can always go back to soft.   2. Continue to eat protein rich foods first at meals to meet your goal of  60-70 g of protein per day  3.  Try one new food at a time and remember to chew, chew, chew!  4. Drink 64 oz. of zero calorie beverages per day  5. Continue no drinking 30 min before, during the meal and for 30 minutes after the meal  6.  Begin structured exercise and build intensity and duration as tolerated.   7.  Continue current vit/min regimen  8. Attend our Virtual Support Group monthly!    CHANGES IN TREATMENT:   Patient met goals: Yes    Actions to meet previous goals: YES    24 hour food recall:    Breakfast:  scrambled eggs w/ salsa  Snack: protein shake (1/2)  Lunch:  1/2 baked potato w/ shredded chicken  Snack:   Protein shake (1/2)  Dinner:  slice of ham, chantel cheese   Snack:   Beverages:  120 oz water w/ SF flavor packets   Alcohol:  none      Vitamins: 2 MVI, 1200 mg calcium, B12  Medications: see list  Physical Activity: steps 12,000 daily, yoga, arm weights   Nausea/Vomiting/Diarrhea/Constipation: couple bouts off not tolerating food - more thick foods; occasionally chest pain with eating    READINESS TO LEARN:  Motivation to learn:           Interested     Understanding of instruction: Good   Anticipated Compliance: Good       Fair     Poor    Family Support: Unable to assess-family not present   Patient presents with post-op gastric bypass.  Pt is 3 months postop.    Weight today is self reported. Weight is up about 5# since last visit, ideal regain. BMI  is WNL.   Tolerating a Regular diet without difficulty.  Protein intake is adequate for post-op individual. Pt is adding good variety of different protein foods. Fluid consumption is adequate. Patient is supplementing recommended vitamin/minerals. Pt is exercising regularly. Pt states no concerns/difficulties at this time.  Encouraged monthly SG meetings.      Appointment conducted via phone d/t COVID-19 protocol. Patient's weight is self-reported.    Malnutrition Screening  Significant unintentional weight loss? n/a  Eating less than 75% of usual intake for more than 2 weeks? n/a      Nutrition Diagnosis:   1. Increased protein and nutrition needs related to altered GI function as evidenced by pt. s/p gastric bypass.        Nutrition Interventions:   1. Modify type and amount of food and nutrients within meals and snacks.  2. Comprehensive Nutrition Education  -Nutrition education materials: SG Schedule, today's recap      Recommendations:    1.        Continue to follow meal plan, eating every 2-3 hours, 3 meals and 2 snacks daily.   2. Eat 2-3 ounces (14-18 grams) protein at each meal/snack. Supplement with your protein drink as needed to meet your daily protein goal of 60-70g/day.   3. Continue to drink 64 oz. of zero calorie beverages per day  4. Continue no drinking 30 min before, during the meal and for 30 minutes after the meal  5. Continue to follow vit/min regimen   6. Continue to exercise  7.         Attend our Virtual Support Group monthly!    Nutrition Monitoring and Evaluation:   Weight loss1-2 pounds per week  Criteria: weight check, food recall  Need for Follow-up: 6 months post op

## 2024-02-12 ENCOUNTER — OFFICE VISIT (OUTPATIENT)
Dept: SURGERY | Facility: CLINIC | Age: 44
End: 2024-02-12
Payer: COMMERCIAL

## 2024-02-12 ENCOUNTER — NUTRITION (OUTPATIENT)
Dept: SURGERY | Facility: CLINIC | Age: 44
End: 2024-02-12
Payer: COMMERCIAL

## 2024-02-12 VITALS — BODY MASS INDEX: 23.05 KG/M2 | WEIGHT: 126 LBS

## 2024-02-12 DIAGNOSIS — K91.2 POSTOPERATIVE MALABSORPTION (HHS-HCC): ICD-10-CM

## 2024-02-12 DIAGNOSIS — Z98.84 S/P GASTRIC BYPASS: Primary | ICD-10-CM

## 2024-02-12 PROCEDURE — 1036F TOBACCO NON-USER: CPT | Performed by: NURSE PRACTITIONER

## 2024-02-12 PROCEDURE — 3008F BODY MASS INDEX DOCD: CPT | Performed by: NURSE PRACTITIONER

## 2024-02-12 PROCEDURE — 99024 POSTOP FOLLOW-UP VISIT: CPT | Performed by: NURSE PRACTITIONER

## 2024-02-12 NOTE — ASSESSMENT & PLAN NOTE
Check micronutrient levels - see orders  Adjust micronutrient supplementation per results  Continue recommended post bariatric surgery supplements

## 2024-02-12 NOTE — ASSESSMENT & PLAN NOTE
2020: Gastric Sleeve @ [facility]   11/15/23: Sleeve--> Bypass + HHR. Dr. Contreras @ [place]     Had been having slow diet progress, had EGD with dilation in December, weight stable  Seeing GI for esophageal spasms, improving and has been able to slowly advance diet.  Tolerating diet with appropriate protein and fluid intake on soft foods.    Taking appropriate supplements  Bowel regularity improving with advancing diet.  Exercise - walking, yoga, arm weights.    Plan:  -Continue to follow protein forward, reduced calorie, whole foods based diet, follow diet direction of bariatric RD  -Continue to participate in regular exercise with goal to achieve 200-300 minutes per week of aerobic & resistance training for preservation of lean body mass.  -Continue multivitamin and supplements to support micronutrient needs  -Join Support Groups  -Ongoing need for anti reflux medications discussed and continued if appropriate - see orders.  Continue follow up with GI  -Bowel hygiene reviewed, encouraged adequate fluids, increased dietary fiber and reviewed as needed use of over the counter treatments to promote bowel support.   -Labs - see orders

## 2024-02-12 NOTE — PATIENT INSTRUCTIONS
"The following are the recommendations we discussed at your appointment today:  1. Nutrition  - Please make sure you are seeing the bariatric dietitian regularly.    Dietitian Information:   Rylie Pedro: [phone]  [facility] [phone]    Your Protein Goals will gradually increase as you get further out from surgery:  3-6 months - 60-75 GRAMS PER DAY    - Follow Pouch Rules;.   Stop drinking 30 minutes before your meals, Take 30 minutes to eat your meal   - NO DRINKING DURING MEALS, Wait for 30 minutes after your meal to drink  - Eat 5 servings of fruits and veggies daily.  A serving is 1 small (tennis ball size) piece of whole fruit, 1/2 cup fresh fruit, 1 cup non starchy vegetables  - Eat 3 small meals and 1-2 healthy, protein rich, snacks per day.    EAT PROTEIN FIRST AT MEALS, 2nd non starchy vegetable or fruit serving, consume starches last and aim for whole grains of small portion.  This pattern of eating ensures you are getting full on high quality protein and higher fiber foods with many vitamins and minerals to support adequate nutrition first.      2. Exercise Recommendations:    Regular physical activity is CRITICAL for long term successful weight management.    Strive for a minimum weekly exercise goal of at least 200 minutes per week of aerobic exercise (45 minutes at least 5 days per week or 30 minutes daily)    Strength based resistance training is critical for helping to maintain and build lean body mass/muscle mass which is very important for long term health.  Having higher muscle mass is associated with better health outcomes and can help to maintain higher calorie expenditure.      Designing a Strength Program:  Select 3-4 exercises that target different major muscle groups.  - Emphasize the following movements \"pull, push, squat, hip hinge\"  \"Pull\" examples - pull up, bent over row or dumbbell row, pull down with weight bar or resistance band  \"Push\" examples - push " "up, bench press, chest flys, dips, overhead press, dumbbell bench press  \"Squat\" examples - air squat, chair squat, lunge steps, goblet squat, front squat, etc  \"Hip hinge\" examples - kettle bell swing, good morning, glute bridge, deadlift, suitcase pick ups, hip thrust    Perform two to three sets of eight to 15 repetitions of each exercise.  Try to use a resistance that feels like an \"8 out of 10\" effort, with 10 being the highest effort you can give.    To get stronger, try increasing either the weight, number of repetitions, number of sets or number of exercises every 4-8 weeks as you feel more comfortable with your current program.      3. Fluids  - Drink at least 60 oz of water every day.   - Wait 30 minutes before or after meals to drink fluids.  - Avoid carbonated beverages.    4. Vitamins  - Remember to take your multivitamins 2 times daily, once in the morning and once in the evening  - Take your calcium 2-3 times daily, at least 2 hours apart from the multivitamin - total daily dose at least 1200-1500mg per day.    - Vitamin D3 3000 units per day  - B12 - depending on your blood work and how well you absorb B12 from your multivitamin you may need additional B12 of 350-500mcg oral per day.    5. Labs  - We will check labs today and results will be communicated via [facility] My Chart  - A copy of the results can be viewed on  My Chart.      Follow-up with Dietitian for bariatric diet optimization  Follow-up with PCP for general health questions and ongoing management of routine health concerns      "

## 2024-02-16 ENCOUNTER — HOSPITAL ENCOUNTER (OUTPATIENT)
Dept: GASTROENTEROLOGY | Facility: HOSPITAL | Age: 44
Setting detail: OUTPATIENT SURGERY
Discharge: HOME | End: 2024-02-16
Payer: COMMERCIAL

## 2024-02-16 ENCOUNTER — ANESTHESIA EVENT (OUTPATIENT)
Dept: GASTROENTEROLOGY | Facility: HOSPITAL | Age: 44
End: 2024-02-16
Payer: COMMERCIAL

## 2024-02-16 ENCOUNTER — ANESTHESIA (OUTPATIENT)
Dept: GASTROENTEROLOGY | Facility: HOSPITAL | Age: 44
End: 2024-02-16
Payer: COMMERCIAL

## 2024-02-16 VITALS
BODY MASS INDEX: 23.19 KG/M2 | OXYGEN SATURATION: 100 % | HEART RATE: 65 BPM | WEIGHT: 126 LBS | SYSTOLIC BLOOD PRESSURE: 102 MMHG | TEMPERATURE: 97.3 F | DIASTOLIC BLOOD PRESSURE: 57 MMHG | RESPIRATION RATE: 16 BRPM | HEIGHT: 62 IN

## 2024-02-16 DIAGNOSIS — Z98.84 S/P GASTRIC BYPASS: ICD-10-CM

## 2024-02-16 DIAGNOSIS — R13.10 DYSPHAGIA, UNSPECIFIED TYPE: ICD-10-CM

## 2024-02-16 PROCEDURE — 2500000004 HC RX 250 GENERAL PHARMACY W/ HCPCS (ALT 636 FOR OP/ED): Performed by: ANESTHESIOLOGIST ASSISTANT

## 2024-02-16 PROCEDURE — 3700000002 HC GENERAL ANESTHESIA TIME - EACH INCREMENTAL 1 MINUTE: Performed by: STUDENT IN AN ORGANIZED HEALTH CARE EDUCATION/TRAINING PROGRAM

## 2024-02-16 PROCEDURE — A43249 PR EDG BALLOON DILATION ESOPHAGUS <30 MM DIAM: Performed by: ANESTHESIOLOGY

## 2024-02-16 PROCEDURE — 7100000009 HC PHASE TWO TIME - INITIAL BASE CHARGE: Performed by: STUDENT IN AN ORGANIZED HEALTH CARE EDUCATION/TRAINING PROGRAM

## 2024-02-16 PROCEDURE — A43249 PR EDG BALLOON DILATION ESOPHAGUS <30 MM DIAM: Performed by: ANESTHESIOLOGIST ASSISTANT

## 2024-02-16 PROCEDURE — 43238 EGD US FINE NEEDLE BX/ASPIR: CPT | Performed by: STUDENT IN AN ORGANIZED HEALTH CARE EDUCATION/TRAINING PROGRAM

## 2024-02-16 PROCEDURE — 2500000005 HC RX 250 GENERAL PHARMACY W/O HCPCS: Performed by: ANESTHESIOLOGIST ASSISTANT

## 2024-02-16 PROCEDURE — 43249 ESOPH EGD DILATION <30 MM: CPT | Performed by: STUDENT IN AN ORGANIZED HEALTH CARE EDUCATION/TRAINING PROGRAM

## 2024-02-16 PROCEDURE — 2720000007 HC OR 272 NO HCPCS: Performed by: STUDENT IN AN ORGANIZED HEALTH CARE EDUCATION/TRAINING PROGRAM

## 2024-02-16 PROCEDURE — 3700000001 HC GENERAL ANESTHESIA TIME - INITIAL BASE CHARGE: Performed by: STUDENT IN AN ORGANIZED HEALTH CARE EDUCATION/TRAINING PROGRAM

## 2024-02-16 PROCEDURE — 2500000004 HC RX 250 GENERAL PHARMACY W/ HCPCS (ALT 636 FOR OP/ED): Performed by: STUDENT IN AN ORGANIZED HEALTH CARE EDUCATION/TRAINING PROGRAM

## 2024-02-16 PROCEDURE — 7100000010 HC PHASE TWO TIME - EACH INCREMENTAL 1 MINUTE: Performed by: STUDENT IN AN ORGANIZED HEALTH CARE EDUCATION/TRAINING PROGRAM

## 2024-02-16 PROCEDURE — 88112 CYTOPATH CELL ENHANCE TECH: CPT | Performed by: PATHOLOGY

## 2024-02-16 PROCEDURE — C1726 CATH, BAL DIL, NON-VASCULAR: HCPCS | Performed by: STUDENT IN AN ORGANIZED HEALTH CARE EDUCATION/TRAINING PROGRAM

## 2024-02-16 PROCEDURE — 88112 CYTOPATH CELL ENHANCE TECH: CPT | Mod: TC | Performed by: STUDENT IN AN ORGANIZED HEALTH CARE EDUCATION/TRAINING PROGRAM

## 2024-02-16 RX ORDER — PROPOFOL 10 MG/ML
INJECTION, EMULSION INTRAVENOUS AS NEEDED
Status: DISCONTINUED | OUTPATIENT
Start: 2024-02-16 | End: 2024-02-16

## 2024-02-16 RX ORDER — LIDOCAINE HYDROCHLORIDE 20 MG/ML
INJECTION, SOLUTION EPIDURAL; INFILTRATION; INTRACAUDAL; PERINEURAL AS NEEDED
Status: DISCONTINUED | OUTPATIENT
Start: 2024-02-16 | End: 2024-02-16

## 2024-02-16 RX ORDER — PROPOFOL 10 MG/ML
INJECTION, EMULSION INTRAVENOUS CONTINUOUS PRN
Status: DISCONTINUED | OUTPATIENT
Start: 2024-02-16 | End: 2024-02-16

## 2024-02-16 RX ORDER — MIDAZOLAM HYDROCHLORIDE 1 MG/ML
INJECTION, SOLUTION INTRAMUSCULAR; INTRAVENOUS AS NEEDED
Status: DISCONTINUED | OUTPATIENT
Start: 2024-02-16 | End: 2024-02-16

## 2024-02-16 RX ORDER — GLYCOPYRROLATE 0.2 MG/ML
INJECTION INTRAMUSCULAR; INTRAVENOUS AS NEEDED
Status: DISCONTINUED | OUTPATIENT
Start: 2024-02-16 | End: 2024-02-16

## 2024-02-16 RX ORDER — ONDANSETRON HYDROCHLORIDE 2 MG/ML
INJECTION, SOLUTION INTRAVENOUS AS NEEDED
Status: DISCONTINUED | OUTPATIENT
Start: 2024-02-16 | End: 2024-02-16

## 2024-02-16 RX ORDER — SODIUM CHLORIDE, SODIUM LACTATE, POTASSIUM CHLORIDE, CALCIUM CHLORIDE 600; 310; 30; 20 MG/100ML; MG/100ML; MG/100ML; MG/100ML
20 INJECTION, SOLUTION INTRAVENOUS CONTINUOUS
Status: DISCONTINUED | OUTPATIENT
Start: 2024-02-16 | End: 2024-02-17 | Stop reason: HOSPADM

## 2024-02-16 RX ORDER — PHENYLEPHRINE HCL IN 0.9% NACL 1 MG/10 ML
SYRINGE (ML) INTRAVENOUS AS NEEDED
Status: DISCONTINUED | OUTPATIENT
Start: 2024-02-16 | End: 2024-02-16

## 2024-02-16 RX ADMIN — LIDOCAINE HYDROCHLORIDE 100 MG: 20 INJECTION, SOLUTION EPIDURAL; INFILTRATION; INTRACAUDAL; PERINEURAL at 11:08

## 2024-02-16 RX ADMIN — SODIUM CHLORIDE, POTASSIUM CHLORIDE, SODIUM LACTATE AND CALCIUM CHLORIDE 20 ML/HR: 600; 310; 30; 20 INJECTION, SOLUTION INTRAVENOUS at 10:42

## 2024-02-16 RX ADMIN — MIDAZOLAM 2 MG: 1 INJECTION INTRAMUSCULAR; INTRAVENOUS at 11:08

## 2024-02-16 RX ADMIN — Medication 200 MCG: at 11:52

## 2024-02-16 RX ADMIN — Medication 100 MCG: at 11:36

## 2024-02-16 RX ADMIN — GLYCOPYRROLATE 0.2 MG: 0.2 INJECTION, SOLUTION INTRAMUSCULAR; INTRAVENOUS at 11:08

## 2024-02-16 RX ADMIN — PROPOFOL 500 MG: 10 INJECTION, EMULSION INTRAVENOUS at 11:10

## 2024-02-16 RX ADMIN — Medication 200 MCG: at 11:42

## 2024-02-16 RX ADMIN — ONDANSETRON 4 MG: 2 INJECTION, SOLUTION INTRAMUSCULAR; INTRAVENOUS at 11:08

## 2024-02-16 RX ADMIN — Medication 100 MCG: at 11:29

## 2024-02-16 RX ADMIN — SODIUM CHLORIDE, POTASSIUM CHLORIDE, SODIUM LACTATE AND CALCIUM CHLORIDE: 600; 310; 30; 20 INJECTION, SOLUTION INTRAVENOUS at 11:07

## 2024-02-16 SDOH — HEALTH STABILITY: MENTAL HEALTH: CURRENT SMOKER: 0

## 2024-02-16 ASSESSMENT — PAIN - FUNCTIONAL ASSESSMENT
PAIN_FUNCTIONAL_ASSESSMENT: 0-10

## 2024-02-16 ASSESSMENT — PAIN SCALES - GENERAL
PAINLEVEL_OUTOF10: 0 - NO PAIN
PAIN_LEVEL: 0
PAINLEVEL_OUTOF10: 0 - NO PAIN

## 2024-02-16 ASSESSMENT — COLUMBIA-SUICIDE SEVERITY RATING SCALE - C-SSRS
6. HAVE YOU EVER DONE ANYTHING, STARTED TO DO ANYTHING, OR PREPARED TO DO ANYTHING TO END YOUR LIFE?: NO
1. IN THE PAST MONTH, HAVE YOU WISHED YOU WERE DEAD OR WISHED YOU COULD GO TO SLEEP AND NOT WAKE UP?: NO
2. HAVE YOU ACTUALLY HAD ANY THOUGHTS OF KILLING YOURSELF?: NO

## 2024-02-16 NOTE — ANESTHESIA PREPROCEDURE EVALUATION
Patient: Juana Christianson    Procedure Information       Anesthesia Start Date/Time: 02/16/24 1106    Scheduled providers: Jonathan Atkinson MD    Procedure: ENDOSCOPIC ULTRASOUND (UPPER)    Location: [facility]            Relevant Problems   GI   (+) Chronic gastroesophageal reflux disease   (+) GERD (gastroesophageal reflux disease)   (+) Hiatal hernia      Neuro/Psych   (+) Carpal tunnel syndrome      Musculoskeletal   (+) Carpal tunnel syndrome       Clinical information reviewed:   Tobacco  Allergies  Meds  Problems  Med Hx  Surg Hx  OB Status    Fam Hx  Soc Hx        NPO Detail:  NPO/Void Status  Carbohydrate Drink Given Prior to Surgery? : N  Date of Last Liquid: 02/15/24  Time of Last Liquid: 2100  Date of Last Solid: 02/15/24  Time of Last Solid: 2100  Last Intake Type: Solid meal  Time of Last Void: 0915         Physical Exam    Airway  Mallampati: II  TM distance: >3 FB     Cardiovascular   Rhythm: regular  Rate: normal     Dental - normal exam     Pulmonary   Breath sounds clear to auscultation     Abdominal            Anesthesia Plan    History of general anesthesia?: yes  History of complications of general anesthesia?: no    ASA 2     general     The patient is not a current smoker.    intravenous induction   Anesthetic plan and risks discussed with patient.    Plan discussed with CAA.

## 2024-02-16 NOTE — DISCHARGE INSTRUCTIONS
Patient Instructions after an Endoscopy      Post-procedure recommendations:  - The patient will be observed post-procedure, until all discharge criteria are met.   - Discharge the patient to home with family member/escort.  - Resume previous diet.  - Continue present medications.  - Observe patient's clinical course following today's procedure with therapeutic intervention.   - Watch for bleeding, perforation, and infection.   - Follow-up with referring physician.   - Return to primary care physician.  - The patient has a contact number available for  emergencies.  The signs and symptoms of potential delayed complications were discussed with the patient.  Return to normal activities tomorrow.  Written discharge instructions were provided to the patient.    The anesthetics, sedatives or narcotics which were given to you today will be acting in your body for the next 24 hours, so you might feel a little sleepy or groggy.  This feeling should slowly wear off. Carefully read and follow the instructions.     You received sedation today:  - Do not drive or operate any machinery or power tools of any kind.   - No alcoholic beverages today, not even beer or wine.  - Do not make any important decisions or sign any legal documents.  - No over the counter medications that contain alcohol or that may cause drowsiness.  - Do not make any important decisions or sign any legal documents.    While it is common to experience mild to moderate abdominal distention, gas, or belching after your procedure, if any of these symptoms occur following discharge from the GI Lab or within one week of having your procedure, call the [facility] to be advised whether a visit to your nearest Urgent Care or Emergency Department is indicated.  Take this paper with you if you go:  - If you develop an allergic reaction to the medications that were given during your procedure such as difficulty breathing, rash, hives, severe nausea,  vomiting or lightheadedness.  - If you experience chest pain, shortness of breath, severe abdominal pain, fevers and chills.  - If you develop signs and symptoms of bleeding such as blood in your spit, if your stools turn black, tarry, or bloody.  - If you have not urinated within 8 hours following your procedure.  - If your IV site becomes painful, red, inflamed, or looks infected.       If you experience any problems or have any questions following discharge from the GI Lab, please call: [phone]

## 2024-02-16 NOTE — ANESTHESIA POSTPROCEDURE EVALUATION
Patient: Juana Christianson    Procedure Summary       Date: 02/16/24 Room / Location: [facility]    Anesthesia Start: 1106 Anesthesia Stop: 1212    Procedure: ENDOSCOPIC ULTRASOUND (UPPER) Diagnosis:       S/P gastric bypass      Dysphagia, unspecified type    Scheduled Providers: Jonathan Atkinson MD Responsible Provider: Elliot Little MD    Anesthesia Type: MAC ASA Status: 2            Anesthesia Type: MAC    Vitals Value Taken Time   BP 88/53 02/16/24 1212   Temp 36 °C (96.8 °F) 02/16/24 1212   Pulse 62 02/16/24 1212   Resp 16 02/16/24 1212   SpO2 100 % 02/16/24 1212       Anesthesia Post Evaluation    Patient location during evaluation: PACU  Patient participation: complete - patient participated  Level of consciousness: sleepy but conscious  Pain score: 0  Pain management: adequate  Airway patency: patent  Cardiovascular status: acceptable  Respiratory status: acceptable  Hydration status: acceptable  Postoperative Nausea and Vomiting: none    No notable events documented.

## 2024-02-19 LAB
LABORATORY COMMENT REPORT: NORMAL
LABORATORY COMMENT REPORT: NORMAL
PATH REPORT.FINAL DX SPEC: NORMAL
PATH REPORT.GROSS SPEC: NORMAL
PATH REPORT.TOTAL CANCER: NORMAL

## 2024-02-20 ENCOUNTER — APPOINTMENT (OUTPATIENT)
Dept: GASTROENTEROLOGY | Facility: CLINIC | Age: 44
End: 2024-02-20
Payer: COMMERCIAL

## 2024-05-13 ENCOUNTER — APPOINTMENT (OUTPATIENT)
Dept: SURGERY | Facility: CLINIC | Age: 44
End: 2024-05-13
Payer: COMMERCIAL

## 2024-05-13 DIAGNOSIS — K91.2 POSTOPERATIVE MALABSORPTION (HHS-HCC): Primary | ICD-10-CM

## 2024-05-13 DIAGNOSIS — Z98.84 S/P GASTRIC BYPASS: ICD-10-CM

## 2024-05-13 DIAGNOSIS — K21.9 GASTROESOPHAGEAL REFLUX DISEASE, UNSPECIFIED WHETHER ESOPHAGITIS PRESENT: ICD-10-CM

## 2024-05-13 NOTE — ASSESSMENT & PLAN NOTE
2020: Gastric Sleeve @ [facility]   11/15/23: Sleeve--> Bypass + HHR. Dr. Contreras @ [facility]      Had been having slow diet progress, had EGD with dilation in December, weight stable  Seeing GI for esophageal spasms, improving and has been able to slowly advance diet.  Tolerating diet with appropriate protein and fluid intake on soft foods.    Taking appropriate supplements  Bowel regularity improving with advancing diet.  Exercise - walking, yoga, arm weights.     Plan:  -Continue to follow protein forward, reduced calorie, whole foods based diet, follow diet direction of bariatric RD  -Continue to participate in regular exercise with goal to achieve 200-300 minutes per week of aerobic & resistance training for preservation of lean body mass.  -Continue multivitamin and supplements to support micronutrient needs  -Join Support Groups  -Ongoing need for anti reflux medications discussed and continued if appropriate - see orders.  Continue follow up with GI  -Bowel hygiene reviewed, encouraged adequate fluids, increased dietary fiber and reviewed as needed use of over the counter treatments to promote bowel support.   -Labs - see orders

## 2024-05-13 NOTE — PROGRESS NOTES
BAR[facility]AT[facility] CLINIC  Comprehensive Weight Management        Name: Juana Christianson  MRN: [id number]  Referred By: Aliza PATEL-CNP (Transfer of Care)     Index Surgery  Date of Surgery: 11/15/2023   Surgeon: Dr. Riaz Contreras                Surgical Procedure: Revision: Sleeve to Bypass 40415 and Hiatal hernia repair 24468     Virtual or Telephone Consent: An interactive audio and video telecommunication system which permits real time communications between the patient (at the originating site) and provider (at the distant site) was utilized to provide this telehealth service. Verbal consent was requested and obtained from Juana Christianson on this date, 05/13/24 for a telehealth visit.      HPI:   Juana Christianson is a 43 year old woman presenting today for a 6 mo post op visit. Initial post op course c/b dysphagia requiring EGD/Dilation in December.  She sees Dr. Atkinson for FNA biopsy of esophageal lesion at GE junction - ongoing.      CT A/P 12/8/23:   IMPRESSION:  1. Prior gastric bypass procedure. Thickening of the wall of the  greater curvature of the stomach is present, presumably related to  prior surgery. Close attention on follow-up recommended.  2. Small sliding-type hiatal hernia is present.  There is appearance  of a soft tissue mass lesion of the gastroesophageal junction which is  suspected related to intrathoracic stomach.  This can be further  assessed with endoscopy as clinically warranted.     Diet:  - has slowly been advancing to soft foods diet.     Exercise:  - doing yoga, walking, arm weights     Concerns related to:  Nausea/Vomiting, Reflux: Improved  Abdominal Pain: Has intermittent esophageal spasm - using peppermint oil   Diarrhea/Constipation No     DAILY SUPPLEMENTS:  Calcium: Calcium Citrate w/ vitamin D (1200 - 1500mg)  Multivitamin & Minerals: 2 per day  Vitamin B12: 500 mcg/day   PPI:BID Omeprazole started 12/11/23 currently on BID and doing well.    Primary Medical Hx:  Patient  Active Problem List   Diagnosis    GERD (gastroesophageal reflux disease)    Hiatal hernia    Carpal tunnel syndrome    Chronic gastroesophageal reflux disease    S/P gastric bypass    Nausea and vomiting    Dehydration    Postoperative malabsorption (HHS-HCC)      Past Surgical History:   Procedure Laterality Date    CHOLECYSTECTOMY  2009    GASTRIC BYPASS  11/15/23    HYSTERECTOMY  2010    OTHER SURGICAL HISTORY      Sleeve Gastrectomy at [facility] in     OTHER SURGICAL HISTORY       Gastric Sleeve at [facility]      Social History     Socioeconomic History    Marital status: Legally      Spouse name: Not on file    Number of children: Not on file    Years of education: Not on file    Highest education level: Not on file   Occupational History    Not on file   Tobacco Use    Smoking status: Former     Current packs/day: 0.00     Average packs/day: 0.5 packs/day for 5.0 years (2.5 ttl pk-yrs)     Types: Cigarettes     Start date: 2016     Quit date: 2020     Years since quittin.3    Smokeless tobacco: Never   Vaping Use    Vaping status: Never Used   Substance and Sexual Activity    Alcohol use: Not Currently     Comment: 1/2 glasses wine yearly    Drug use: Never    Sexual activity: Not Currently     Partners: Male     Comment:  Hysterectomy   Other Topics Concern    Not on file   Social History Narrative    Not on file     Social Determinants of Health     Financial Resource Strain: Low Risk  (11/15/2023)    Overall Financial Resource Strain (CARDIA)     Difficulty of Paying Living Expenses: Not hard at all   Food Insecurity: No Food Insecurity (2023)    Received from [facility]    Hunger Screening     Within the past 12 months we worried whether our food would run out before we got money to buy more.: Never True     Within the past 12 months the food we bought just didn't last and we didn't have money to get more.: Never True    Transportation Needs: No Transportation Needs (11/15/2023)    PRAPARE - Transportation     Lack of Transportation (Medical): No     Lack of Transportation (Non-Medical): No   Physical Activity: Not on file   Stress: Not on file   Social Connections: Not on file   Intimate Partner Violence: Not on file   Housing Stability: Low Risk  (11/15/2023)    Housing Stability Vital Sign     Unable to Pay for Housing in the Last Year: No     Number of Places Lived in the Last Year: 1     Unstable Housing in the Last Year: No     Family History   Problem Relation Name Age of Onset    Stroke Mother Araceli Christianson     Heart disease Father Quentin Wong       Current Outpatient Medications on File Prior to Visit   Medication Sig Dispense Refill    B complex-vitamin C-folic acid (Nephro-Juan Rx) 1- mg-mg-mcg tablet Take 1 tablet by mouth once daily with breakfast.      famotidine (Pepcid) 20 mg tablet Take 1 tablet (20 mg) by mouth 2 times a day as needed.      multivitamin/iron/folic acid (CENTRUM WOMEN ORAL) Take 1 capsule by mouth once daily.      omeprazole (PriLOSEC) 40 mg DR capsule Take 1 capsule (40 mg) by mouth once daily in the morning. Take before meals. Do not crush or chew. Open capsule, sprinkle beads on SF jello, pudding or applesauce. 90 capsule 1    ondansetron ODT (Zofran-ODT) 4 mg disintegrating tablet Take 1 tablet (4 mg) by mouth every 6 hours if needed for nausea or vomiting. (Patient not taking: Reported on 2/16/2024) 60 tablet 1     No current facility-administered medications on file prior to visit.      No Known Allergies    REVIEW OF SYSTEMS:  Negative unless otherwise reviewed in HPI.    PHYSICAL EXAM   Physical Exam   Wt 57.2 kg (126 lb)   BMI 23.05 kg/m²  ***  Alert, well appearing, no acute distress, nourished, hydrated.  Anicteric sclera, no ptosis  Facial symmetry  Neck supple  Unlabored respirations.  Easily conversant without increased respiratory effort  Oriented to person, place,  time.  Judgement intact.  Appropriate mood, affect.      ASSESSMENT & PLAN:  44 y.o. female presenting for 6 month follow up visit s/p Sleeve to Bypass conversion with HHR 11/15/2023.     Weight Impression:  -Initial BMI:  -Current BMI:  -Initial Weight:  -Today's Weight:  -%Total Weight Loss:     {Assess/Plan SmartLinks (Optional):35120}     - Reviewed impact of diet, physical activity, stress & sleep to cardio metabolic health risks and ways to modify current lifestyle to reduce cardiometabolic health risks associated with obesity.    - Counseled on benefits of increased regular exercise, both aerobic & resistance training.  Reviewed benefits of resistance training to enhance/maintain lean body mass.    - Counseled on dietary modifications to support weight reduction, reduced calorie diet, encourage adequate protein, increased dietary fiber from fruit and vegetable to improve appetite/satiety regulation and quality of diet.  Discussed impact of processed foods and liquid calories to weight gain & contribution to dysfunctional appetite signals.     -  Reviewed importance of intensification of lifestyle therapy in weight reduction and maintenance, set behavioral modification goals of nutrition, physical activity or behavioral practices to benefit weight reduction.    - Discussed self monitoring practices to ensure reduce calorie diet, emphasizing increased dietary protein & fiber.  Reviewed protein targets per meal as recommendation to help with satiety and lean mass maintenance.      Please see Patient Instructions for today's relevant referrals, orders and instructions.  > 50% of time spent counseling on lifestyle modifications to support weight loss.      Follow Up: No follow-ups on file.     Amanda Davenport, APRN-CNP

## (undated) DEVICE — DEVICE, SUTURE, ENDOSTITCH, 10 MM

## (undated) DEVICE — ENDO, GIA HANDLE XL

## (undated) DEVICE — KIT, LAP GASTRIC BYPASS, CUSTOM, PARMA

## (undated) DEVICE — Device

## (undated) DEVICE — NEEDLE, CLOSURE, OMNICLOSE

## (undated) DEVICE — IRRIGATOR, HYDRO-SURG PLUS, WITH COJOINED SUCTION AND IRRIGATION

## (undated) DEVICE — TROCAR, OPTICAL, BLADELESS, KII FIOS, 5 X 100MM, THREADED

## (undated) DEVICE — BINDER, ABDOMINAL, 4 PANEL, 12 X 30-45 IN

## (undated) DEVICE — NEEDLE, EPIDURAL 17G X 4 1/2 PERIFIX

## (undated) DEVICE — SLEEVE, VASO PRESS, CALF GARMENT, LARGE, GREEN

## (undated) DEVICE — GLIDESCOPE BLADE, SPECTRUM SU, LOPRO S3

## (undated) DEVICE — NEEDLE, HYPODERMIC, SAFETYGLIDE, SHIELDING, REGULAR WALL, REGULAR BEVEL, 22 G X 1.5 IN, BLACK HUB

## (undated) DEVICE — HEMOSTAT, ABSORABABLE, SURGICEL SNOW, 2 X 4, LF

## (undated) DEVICE — COVER PROBE, ULTRASOUND GUSSETED, W/ GEL, 5 X 48 IN, CLOSED END

## (undated) DEVICE — CARE KIT, LAPAROSCOPIC, ADVANCED

## (undated) DEVICE — PROTECTOR, HEEL/ANKLE/ELBOW, UNIVERSAL

## (undated) DEVICE — ENDO, GIA 60 MED/THCK ARTIC VASC RELOAD

## (undated) DEVICE — LIGASURE, L-HOOK 44CM SEALER/DIVIDER LAP, MARYLAND

## (undated) DEVICE — CATHETER, DRAINAGE, NASOGASTRIC, DOUBLE LUMEN, FUNNEL END, SUMP, SALEM, 18 FR, 48 IN, PVC, STERILE

## (undated) DEVICE — SLEEVE, KII, Z-THREAD, 5X100CM

## (undated) DEVICE — ENDO, GIA 60 MED/THCK ARTIC RELOAD

## (undated) DEVICE — SYRINGE, 30 CC, LUER LOCK

## (undated) DEVICE — MAT, AIR TRANSFER, 39X81

## (undated) DEVICE — SLEEVE, KII, Z-THREAD, 12X100CM

## (undated) DEVICE — TROCAR, OPTICAL, BLADELESS, 12MM, THREADED, 100MM LENGTH

## (undated) DEVICE — LUBRICANT, WATER SOLUBLE, BACTERIOSTATIC, 2 OZ, STERILE

## (undated) DEVICE — ENDO, GIA 45 MED/THCK ARTIC VASC RELOAD

## (undated) DEVICE — SYSTEM, FIOS FIRST ENTRY, 12 X 100MM, KII ADVANCED FIXATION

## (undated) DEVICE — TUBE SET, PNEUMOLAR HEATED, SMOKE EVACU, HIGH-FLOW

## (undated) DEVICE — BINDER, ABDOMINAL, 4 PANEL, 12 X 63-74 IN

## (undated) DEVICE — STAPLE, BIOABSORB, SEAMGUARD, TRI60 PURPLE

## (undated) DEVICE — RELOAD, ENDOSTITCH, SURGIDAC, 0, 48 IN, GREEN, SULU

## (undated) DEVICE — RELOAD, ENDOSTITCH, SURGIDAC, 2-0, 48 IN, GREEN, SULU

## (undated) DEVICE — APPLICATOR, CHLORAPREP, W/ORANGE TINT, 26ML

## (undated) DEVICE — ASSEMBLY, ANVIL EEA ORVIL 25MM DST